# Patient Record
Sex: FEMALE | Race: WHITE | URBAN - METROPOLITAN AREA
[De-identification: names, ages, dates, MRNs, and addresses within clinical notes are randomized per-mention and may not be internally consistent; named-entity substitution may affect disease eponyms.]

---

## 2017-01-30 ENCOUNTER — APPOINTMENT (OUTPATIENT)
Dept: CT IMAGING | Facility: CLINIC | Age: 82
End: 2017-01-30
Attending: EMERGENCY MEDICINE
Payer: MEDICARE

## 2017-01-30 ENCOUNTER — APPOINTMENT (OUTPATIENT)
Dept: GENERAL RADIOLOGY | Facility: CLINIC | Age: 82
End: 2017-01-30
Attending: EMERGENCY MEDICINE
Payer: MEDICARE

## 2017-01-30 ENCOUNTER — HOSPITAL ENCOUNTER (EMERGENCY)
Facility: CLINIC | Age: 82
Discharge: HOME | End: 2017-01-30
Attending: EMERGENCY MEDICINE | Admitting: EMERGENCY MEDICINE
Payer: MEDICARE

## 2017-01-30 VITALS
SYSTOLIC BLOOD PRESSURE: 129 MMHG | BODY MASS INDEX: 23.3 KG/M2 | WEIGHT: 140 LBS | RESPIRATION RATE: 16 BRPM | OXYGEN SATURATION: 99 % | HEART RATE: 103 BPM | DIASTOLIC BLOOD PRESSURE: 69 MMHG | TEMPERATURE: 97.6 F
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
## 2017-01-30 DIAGNOSIS — S32.010A COMPRESSION FRACTURE OF L1 LUMBAR VERTEBRA, CLOSED, INITIAL ENCOUNTER (H): ICD-10-CM

## 2017-01-30 DIAGNOSIS — S22.080A T12 COMPRESSION FRACTURE, INITIAL ENCOUNTER (H): ICD-10-CM

## 2017-01-30 DIAGNOSIS — M54.50 ACUTE BILATERAL LOW BACK PAIN WITHOUT SCIATICA: ICD-10-CM

## 2017-01-30 LAB
ABO + RH BLD: NORMAL
ABO + RH BLD: NORMAL
ANION GAP SERPL CALCULATED.3IONS-SCNC: 10 MMOL/L (ref 3–14)
BASOPHILS # BLD AUTO: 0 10E9/L (ref 0–0.2)
BASOPHILS NFR BLD AUTO: 0.2 %
BLD GP AB SCN SERPL QL: NORMAL
BLOOD BANK CMNT PATIENT-IMP: NORMAL
BUN SERPL-MCNC: 34 MG/DL (ref 7–30)
CALCIUM SERPL-MCNC: 8.7 MG/DL (ref 8.5–10.1)
CHLORIDE SERPL-SCNC: 101 MMOL/L (ref 94–109)
CO2 SERPL-SCNC: 25 MMOL/L (ref 20–32)
CREAT SERPL-MCNC: 0.93 MG/DL (ref 0.52–1.04)
DIFFERENTIAL METHOD BLD: ABNORMAL
EOSINOPHIL # BLD AUTO: 0 10E9/L (ref 0–0.7)
EOSINOPHIL NFR BLD AUTO: 0.4 %
ERYTHROCYTE [DISTWIDTH] IN BLOOD BY AUTOMATED COUNT: 17.1 % (ref 10–15)
GFR SERPL CREATININE-BSD FRML MDRD: 56 ML/MIN/1.7M2
GLUCOSE SERPL-MCNC: 102 MG/DL (ref 70–99)
HCT VFR BLD AUTO: 30.4 % (ref 35–47)
HGB BLD-MCNC: 9.4 G/DL (ref 11.7–15.7)
IMM GRANULOCYTES # BLD: 0 10E9/L (ref 0–0.4)
IMM GRANULOCYTES NFR BLD: 0.4 %
INR PPP: 2.87 (ref 0.86–1.14)
INTERPRETATION ECG - MUSE: NORMAL
LYMPHOCYTES # BLD AUTO: 0.5 10E9/L (ref 0.8–5.3)
LYMPHOCYTES NFR BLD AUTO: 6.3 %
MCH RBC QN AUTO: 25.4 PG (ref 26.5–33)
MCHC RBC AUTO-ENTMCNC: 30.9 G/DL (ref 31.5–36.5)
MCV RBC AUTO: 82 FL (ref 78–100)
MONOCYTES # BLD AUTO: 0.8 10E9/L (ref 0–1.3)
MONOCYTES NFR BLD AUTO: 10.1 %
NEUTROPHILS # BLD AUTO: 6.9 10E9/L (ref 1.6–8.3)
NEUTROPHILS NFR BLD AUTO: 82.6 %
NRBC # BLD AUTO: 0 10*3/UL
NRBC BLD AUTO-RTO: 0 /100
PLATELET # BLD AUTO: 319 10E9/L (ref 150–450)
POTASSIUM SERPL-SCNC: 3.7 MMOL/L (ref 3.4–5.3)
RBC # BLD AUTO: 3.7 10E12/L (ref 3.8–5.2)
SODIUM SERPL-SCNC: 136 MMOL/L (ref 133–144)
SPECIMEN EXP DATE BLD: NORMAL
WBC # BLD AUTO: 8.3 10E9/L (ref 4–11)

## 2017-01-30 PROCEDURE — 99285 EMERGENCY DEPT VISIT HI MDM: CPT | Mod: 25

## 2017-01-30 PROCEDURE — 70450 CT HEAD/BRAIN W/O DYE: CPT

## 2017-01-30 PROCEDURE — 86850 RBC ANTIBODY SCREEN: CPT | Performed by: EMERGENCY MEDICINE

## 2017-01-30 PROCEDURE — 80048 BASIC METABOLIC PNL TOTAL CA: CPT | Performed by: EMERGENCY MEDICINE

## 2017-01-30 PROCEDURE — 72100 X-RAY EXAM L-S SPINE 2/3 VWS: CPT

## 2017-01-30 PROCEDURE — 85610 PROTHROMBIN TIME: CPT | Performed by: EMERGENCY MEDICINE

## 2017-01-30 PROCEDURE — 93005 ELECTROCARDIOGRAM TRACING: CPT

## 2017-01-30 PROCEDURE — 73523 X-RAY EXAM HIPS BI 5/> VIEWS: CPT

## 2017-01-30 PROCEDURE — 25000132 ZZH RX MED GY IP 250 OP 250 PS 637: Mod: GY | Performed by: EMERGENCY MEDICINE

## 2017-01-30 PROCEDURE — 86900 BLOOD TYPING SEROLOGIC ABO: CPT | Performed by: EMERGENCY MEDICINE

## 2017-01-30 PROCEDURE — 86901 BLOOD TYPING SEROLOGIC RH(D): CPT | Performed by: EMERGENCY MEDICINE

## 2017-01-30 PROCEDURE — A9270 NON-COVERED ITEM OR SERVICE: HCPCS | Mod: GY | Performed by: EMERGENCY MEDICINE

## 2017-01-30 PROCEDURE — 85025 COMPLETE CBC W/AUTO DIFF WBC: CPT | Performed by: EMERGENCY MEDICINE

## 2017-01-30 PROCEDURE — 73110 X-RAY EXAM OF WRIST: CPT | Mod: LT

## 2017-01-30 RX ORDER — ONDANSETRON 2 MG/ML
4 INJECTION INTRAMUSCULAR; INTRAVENOUS EVERY 30 MIN PRN
Status: DISCONTINUED | OUTPATIENT
Start: 2017-01-30 | End: 2017-01-30 | Stop reason: HOSPADM

## 2017-01-30 RX ORDER — ACETAMINOPHEN 325 MG/1
325 TABLET ORAL ONCE
Status: DISCONTINUED | OUTPATIENT
Start: 2017-01-30 | End: 2017-01-30 | Stop reason: HOSPADM

## 2017-01-30 RX ORDER — ACETAMINOPHEN 325 MG/1
650 TABLET ORAL ONCE
Status: COMPLETED | OUTPATIENT
Start: 2017-01-30 | End: 2017-01-30

## 2017-01-30 RX ORDER — MORPHINE SULFATE 2 MG/ML
2 INJECTION, SOLUTION INTRAMUSCULAR; INTRAVENOUS
Status: DISCONTINUED | OUTPATIENT
Start: 2017-01-30 | End: 2017-01-30 | Stop reason: HOSPADM

## 2017-01-30 RX ADMIN — ACETAMINOPHEN 650 MG: 325 TABLET, FILM COATED ORAL at 07:17

## 2017-01-30 NOTE — DISCHARGE INSTRUCTIONS
*You may resume diet and activities.  *Take medications as prescribed.  Tylenol for pain.  Continue your current medications.  *Follow-up with your doctor in the next 2-3 days for reevaluation and ongoing medication prescriptions and referral to physical therapy or the spine specialist.  *Return if you develop numbness, weakness, bowel or bladder incontinence, faint or feel like you will faint or become worse in any way.    Discharge Instructions  Back Pain  You were seen today for back pain. Back pain can have many causes, but most will get better without surgery or other specific treatment. Sometimes there is a herniated ( slipped ) disc. We don t usually do MRI scans to look for these right away, since most herniated discs will get better on their own with time.  Today, we did not find any evidence that your back pain was caused by a serious condition, such as an infection, fracture, or tumor. However, sometimes symptoms develop over time and cannot be found during an emergency visit, so it is very important that you follow up with your primary doctor.  Return to the Emergency Department if:    You develop a fever with your back pain.     You have weakness or change in sensation in one or both legs.    You lose control of your bowels or bladder, or can t empty your bladder.    Your pain gets much worse.     Follow-up with your doctor:    Unless your pain has completely gone away, please make an appointment with your doctor within one week.  You may need further management of your back pain, such as more pain medication, imaging such as an X-ray or MRI, or physical therapy.    What can I do to help myself?    Remain active -- People are often afraid that they will hurt their back further or delay recovery by remaining active, but this is one of the best things you can do for your back. In fact, prolonged bed rest is not recommended. Studies have shown that people with low back pain recover faster when they remain  active. Movement helps to bring blood flow to the muscles and relieve muscle spasms as well as preventing loss of muscle strength.    Heat -- Using a heating pad can help with low back pain during the first few weeks. Do not sleep with a heating pad, as you can be burned.     Pain medications -- Take a pain medication such as, acetaminophen (Tylenol ), ibuprofen (Advil , Nuprin  ) or naproxen (Aleve ).  If you have been given a narcotic (such as codeine, hydrocodone, or oxycodone) or a muscle relaxant (such as Flexeril   or Soma  ), do not drive for four hours after you have taken it. If the narcotic contains acetaminophen (Tylenol), do not take Tylenol with it. All narcotics will cause constipation, so eat a high fiber diet.          Remember that you can always come back to the Emergency Department if you are not able to see your regular doctor in the amount of time listed above, if you get any new symptoms, or if there is anything that worries you.    Opioid Medication Information    You have been given a prescription for an opioid (narcotic) pain medicine and/or have received a pain medicine while here in the Emergency Department. These medicines can make you drowsy or impaired. You must not drive, operate dangerous equipment, or engage in any other dangerous activities while taking these medications. If you drive while taking these medications, you could be arrested for DUI, or driving under the influence. Do not drink any alcohol while you are taking these medications.   Opioid pain medications can cause addiction. If you have a history of chemical dependency of any type, you are at a higher risk of becoming addicted to pain medications.  Only take these prescribed medications to treat your pain when all other options have been tried. Take it for as short a time and as few doses as possible. Store your pain pills in a secure place, as they are frequently stolen and provide a dangerous opportunity for children  or visitors in your house to start abusing these powerful medications. We will not replace any lost or stolen medicine.  As soon as your pain is better, you should flush all your remaining medication.   Many prescription pain medications contain Tylenol  (acetaminophen), including Vicodin , Tylenol #3 , Norco , Lortab , and Percocet .  You should not take any extra pills of Tylenol  if you are using these prescription medications or you can get very sick.  Do not ever take more than 4000 mg of acetaminophen in any 24 hour period.  All opioids tend to cause constipation. Drink plenty of water and eat foods that have a lot of fiber, such as fruits, vegetables, prune juice, apple juice and high fiber cereal.  Take a laxative if you don t move your bowels at least every other day. Miralax , Milk of Magnesia, Colace , or Senna  can be used to keep you regular.          Back Fracture (Compression Fracture)  Your spine stretches from the base of your skull to your tailbone. It's composed of 33 bones (vertebrae) stacked on top of one another. These bones are strong enough to support the weight of your upper body. Certain injuries, however, can damage one or more of the vertebrae and cause them to collapse. A collapsed bone in your spine is known as a compression fracture.    Causes of compression fracture  Many compression fractures result from osteoporosis. This disease thins your bones so they can't withstand normal pressure. Trauma from a car accident or hard fall can fracture even healthy vertebrae. In rare cases, vertebrae may fracture for unknown reasons.  When to go to the Emergency Room (ER)  Call 911 if you've been in an accident or had a fall and have neck or back pain, especially when pain occurs with any of these symptoms:    Loss of control over your bowels or bladder    Numbness or weakness    High fever    Unexplained back pain in a person with cancer  What to expect in the ER  A healthcare provider will ask  about your medical history and examine you. In some cases, you may have X-rays. You also may have other tests, such as computed tomography (CT) or magnetic resonance imaging (MRI). These tests can provide detailed images of your bones and spinal cord.  Treatment  Treatment will depend on the type and cause of the fracture. You will be given medicine for pain. Severe fractures or those that cause nerve problems may need surgery. Many compression fractures mend on their own.  Follow-up  As you improve, you may be given exercises to strengthen your bones. If you have osteoporosis, your healthcare provider may prescribe a medicine to treat it. Sometimes you may have pain even after the bone has healed. In that case, your healthcare provider will discuss your choices with you.    5698-4826 [employer]. [address] [place] [address], [place] [address]. All rights reserved. This information is not intended as a substitute for professional medical care. Always follow your healthcare professional's instructions.

## 2017-01-30 NOTE — ED AVS SNAPSHOT
Emergency Department    [address] [place] [id number]    Phone:  [phone]    Fax:  [phone]                                       Alida Jefferson   MRN: [id number]    Department:   Emergency Department   Date of Visit:  1/30/2017           After Visit Summary Signature Page     I have received my discharge instructions, and my questions have been answered. I have discussed any challenges I see with this plan with the nurse or doctor.    ..........................................................................................................................................  Patient/Patient Representative Signature      ..........................................................................................................................................  Patient Representative Print Name and Relationship to Patient    ..................................................               ................................................  Date                                            Time    ..........................................................................................................................................  Reviewed by Signature/Title    ...................................................              ..............................................  Date                                                            Time

## 2017-01-30 NOTE — ED AVS SNAPSHOT
Emergency Department    [address] [facility] [address]    Phone:  [phone]    Fax:  [phone]                                       Alida Jefferson   MRN: [id number]    Department:   Emergency Department   Date of Visit:  1/30/2017           Patient Information     Date Of Birth          5/21/1923        Your diagnoses for this visit were:     T12 compression fracture, initial encounter (H)     Compression fracture of L1 lumbar vertebra, closed, initial encounter (H)     Acute bilateral low back pain without sciatica     Accidental fall, initial encounter        You were seen by Luiza Mae MD.      Follow-up Information     Follow up with Steffen Saunders MD. Schedule an appointment as soon as possible for a visit in 2 days.    Specialty:  Family Practice    Contact information:    [facility]  [address]  [facility] [address] [phone]          Discharge Instructions       *You may resume diet and activities.  *Take medications as prescribed.  Tylenol for pain.  Continue your current medications.  *Follow-up with your doctor in the next 2-3 days for reevaluation and ongoing medication prescriptions and referral to physical therapy or the spine specialist.  *Return if you develop numbness, weakness, bowel or bladder incontinence, faint or feel like you will faint or become worse in any way.    Discharge Instructions  Back Pain  You were seen today for back pain. Back pain can have many causes, but most will get better without surgery or other specific treatment. Sometimes there is a herniated ( slipped ) disc. We don t usually do MRI scans to look for these right away, since most herniated discs will get better on their own with time.  Today, we did not find any evidence that your back pain was caused by a serious condition, such as an infection, fracture, or tumor. However, sometimes symptoms develop over time and cannot be found during an emergency visit, so it is very  important that you follow up with your primary doctor.  Return to the Emergency Department if:    You develop a fever with your back pain.     You have weakness or change in sensation in one or both legs.    You lose control of your bowels or bladder, or can t empty your bladder.    Your pain gets much worse.     Follow-up with your doctor:    Unless your pain has completely gone away, please make an appointment with your doctor within one week.  You may need further management of your back pain, such as more pain medication, imaging such as an X-ray or MRI, or physical therapy.    What can I do to help myself?    Remain active -- People are often afraid that they will hurt their back further or delay recovery by remaining active, but this is one of the best things you can do for your back. In fact, prolonged bed rest is not recommended. Studies have shown that people with low back pain recover faster when they remain active. Movement helps to bring blood flow to the muscles and relieve muscle spasms as well as preventing loss of muscle strength.    Heat -- Using a heating pad can help with low back pain during the first few weeks. Do not sleep with a heating pad, as you can be burned.     Pain medications -- Take a pain medication such as, acetaminophen (Tylenol ), ibuprofen (Advil , Nuprin  ) or naproxen (Aleve ).  If you have been given a narcotic (such as codeine, hydrocodone, or oxycodone) or a muscle relaxant (such as Flexeril   or Soma  ), do not drive for four hours after you have taken it. If the narcotic contains acetaminophen (Tylenol), do not take Tylenol with it. All narcotics will cause constipation, so eat a high fiber diet.          Remember that you can always come back to the Emergency Department if you are not able to see your regular doctor in the amount of time listed above, if you get any new symptoms, or if there is anything that worries you.    Opioid Medication Information    You have been  given a prescription for an opioid (narcotic) pain medicine and/or have received a pain medicine while here in the Emergency Department. These medicines can make you drowsy or impaired. You must not drive, operate dangerous equipment, or engage in any other dangerous activities while taking these medications. If you drive while taking these medications, you could be arrested for DUI, or driving under the influence. Do not drink any alcohol while you are taking these medications.   Opioid pain medications can cause addiction. If you have a history of chemical dependency of any type, you are at a higher risk of becoming addicted to pain medications.  Only take these prescribed medications to treat your pain when all other options have been tried. Take it for as short a time and as few doses as possible. Store your pain pills in a secure place, as they are frequently stolen and provide a dangerous opportunity for children or visitors in your house to start abusing these powerful medications. We will not replace any lost or stolen medicine.  As soon as your pain is better, you should flush all your remaining medication.   Many prescription pain medications contain Tylenol  (acetaminophen), including Vicodin , Tylenol #3 , Norco , Lortab , and Percocet .  You should not take any extra pills of Tylenol  if you are using these prescription medications or you can get very sick.  Do not ever take more than 4000 mg of acetaminophen in any 24 hour period.  All opioids tend to cause constipation. Drink plenty of water and eat foods that have a lot of fiber, such as fruits, vegetables, prune juice, apple juice and high fiber cereal.  Take a laxative if you don t move your bowels at least every other day. Miralax , Milk of Magnesia, Colace , or Senna  can be used to keep you regular.          Back Fracture (Compression Fracture)  Your spine stretches from the base of your skull to your tailbone. It's composed of 33 bones  (vertebrae) stacked on top of one another. These bones are strong enough to support the weight of your upper body. Certain injuries, however, can damage one or more of the vertebrae and cause them to collapse. A collapsed bone in your spine is known as a compression fracture.    Causes of compression fracture  Many compression fractures result from osteoporosis. This disease thins your bones so they can't withstand normal pressure. Trauma from a car accident or hard fall can fracture even healthy vertebrae. In rare cases, vertebrae may fracture for unknown reasons.  When to go to the Emergency Room (ER)  Call 911 if you've been in an accident or had a fall and have neck or back pain, especially when pain occurs with any of these symptoms:    Loss of control over your bowels or bladder    Numbness or weakness    High fever    Unexplained back pain in a person with cancer  What to expect in the ER  A healthcare provider will ask about your medical history and examine you. In some cases, you may have X-rays. You also may have other tests, such as computed tomography (CT) or magnetic resonance imaging (MRI). These tests can provide detailed images of your bones and spinal cord.  Treatment  Treatment will depend on the type and cause of the fracture. You will be given medicine for pain. Severe fractures or those that cause nerve problems may need surgery. Many compression fractures mend on their own.  Follow-up  As you improve, you may be given exercises to strengthen your bones. If you have osteoporosis, your healthcare provider may prescribe a medicine to treat it. Sometimes you may have pain even after the bone has healed. In that case, your healthcare provider will discuss your choices with you.    3061-5447 [employer]. [address] [place] [address], [place] [address]. All rights reserved. This information is not intended as a substitute for professional medical care. Always follow your healthcare  professional's instructions.                24 Hour Appointment Hotline       To make an appointment at any [facility], call [phone] ([phone]). If you don't have a family doctor or clinic, we will help you find one. [facility] clinics are conveniently located to serve the needs of you and your family.          ED Discharge Orders     Home Care OT Referral for Hospital Discharge       OT to eval and treat    Your provider has ordered home care - occupational therapy. If you have not been contacted within 2 days of your discharge please call the department phone number listed on the top of this document.            Home Care PT Referral for Hospital Discharge       PT to eval and treat SOC 1/31/2017  HHA for assistance with ADL    Your provider has ordered home care - physical therapy. If you have not been contacted within 2 days of your discharge please call the department phone number listed on the top of this document.            MD face to face encounter       Documentation of Face to Face and Certification for Home Health Services    I certify that patient: Alida Jefferson is under my care and that I, or a nurse practitioner or physician's assistant working with me, had a face-to-face encounter that meets the physician face-to-face encounter requirements with this patient on: 1/30/2017.    This encounter with the patient was in whole, or in part, for the following medical condition, which is the primary reason for home health care: recent fall needs assistance with pain control and ADL's.    I certify that, based on my findings, the following services are medically necessary home health services: Physical Therapy.    My clinical findings support the need for the above services because: Recent fall and back pain/compression fracture    Further, I certify that my clinical findings support that this patient is homebound (i.e. absences from home require considerable and taxing effort and are for  medical reasons or [facility] services or infrequently or of short duration when for other reasons) because: Requires assistance of another person or specialized equipment to access medical services because patient: Has prohibitive pain during ambulation...    Based on the above findings. I certify that this patient is confined to the home and needs intermittent skilled nursing care, physical therapy and/or speech therapy.  The patient is under my care, and I have initiated the establishment of the plan of care.  This patient will be followed by a physician who will periodically review the plan of care.  Physician/Provider to provide follow up care: Steffen Saunders    Attending hospital physician (the Medicare certified [place] provider): Claudy Bateman  Physician Signature: See electronic signature associated with these discharge orders.  Date: 1/30/2017                     Review of your medicines      Our records show that you are taking the medicines listed below. If these are incorrect, please call your family doctor or clinic.        Dose / Directions Last dose taken    CARVEDILOL PO   Dose:  6.25 mg        Take 6.25 mg by mouth 2 times daily   Refills:  0        ferrous gluconate 324 (38 FE) MG tablet   Commonly known as:  FERGON   Dose:  324 mg   Quantity:  30 tablet        Take 1 tablet (324 mg) by mouth daily   Refills:  0        lisinopril-hydrochlorothiazide 10-12.5 MG per tablet   Commonly known as:  PRINZIDE/ZESTORETIC   Dose:  1 tablet        Take 1 tablet by mouth daily   Refills:  0        oxyCODONE 5 MG IR tablet   Commonly known as:  ROXICODONE   Dose:  5 mg   Quantity:  40 tablet        Take 1 tablet (5 mg) by mouth every 3 hours as needed for moderate to severe pain   Refills:  0        senna-docusate 8.6-50 MG per tablet   Commonly known as:  SENOKOT-S;PERICOLACE   Dose:  1-2 tablet   Quantity:  60 tablet        Take 1-2 tablets by mouth 2 times daily as needed (constipation )   Refills:  0         Warfarin Therapy Reminder   Dose:  1 each        1 each continuous prn   Refills:  0        zolpidem 5 MG tablet   Commonly known as:  AMBIEN   Dose:  5 mg   Quantity:  30 tablet        Take 1 tablet (5 mg) by mouth nightly as needed for sleep   Refills:  0                Procedures and tests performed during your visit     ABO/Rh type and screen    Basic metabolic panel    CBC with platelets differential    CT Head w/o Contrast    EKG 12-lead, tracing only    I have reviewed and agree with all the recommendations and orders detailed in this document.    INR    Peripheral IV: Standard    Pulse oximetry nursing    Vital signs    Wrist XR, G/E 3 views, left    XR Lumbar Spine 2/3 Views    XR Pelvis and Hip Bilateral 2 Views      Orders Needing Specimen Collection     None      Pending Results     No orders found from 1/29/2017 to 1/31/2017.            Pending Culture Results     No orders found from 1/29/2017 to 1/31/2017.       Test Results from your hospital stay           1/30/2017  6:21 AM - Interface, [employer] Results      Component Results     Component Value Ref Range & Units Status    WBC 8.3 4.0 - 11.0 10e9/L Final    RBC Count 3.70 (L) 3.8 - 5.2 10e12/L Final    Hemoglobin 9.4 (L) 11.7 - 15.7 g/dL Final    Hematocrit 30.4 (L) 35.0 - 47.0 % Final    MCV 82 78 - 100 fl Final    MCH 25.4 (L) 26.5 - 33.0 pg Final    MCHC 30.9 (L) 31.5 - 36.5 g/dL Final    RDW 17.1 (H) 10.0 - 15.0 % Final    Platelet Count 319 150 - 450 10e9/L Final    Diff Method Automated Method  Final    % Neutrophils 82.6 % Final    % Lymphocytes 6.3 % Final    % Monocytes 10.1 % Final    % Eosinophils 0.4 % Final    % Basophils 0.2 % Final    % Immature Granulocytes 0.4 % Final    Nucleated RBCs 0 0 /100 Final    Absolute Neutrophil 6.9 1.6 - 8.3 10e9/L Final    Absolute Lymphocytes 0.5 (L) 0.8 - 5.3 10e9/L Final    Absolute Monocytes 0.8 0.0 - 1.3 10e9/L Final    Absolute Eosinophils 0.0 0.0 - 0.7 10e9/L Final    Absolute Basophils 0.0  0.0 - 0.2 10e9/L Final    Abs Immature Granulocytes 0.0 0 - 0.4 10e9/L Final    Absolute Nucleated RBC 0.0  Final         1/30/2017  6:33 AM - Interface, [employer] Results      Component Results     Component Value Ref Range & Units Status    INR 2.87 (H) 0.86 - 1.14 Final         1/30/2017  6:39 AM - Interface, [employer] Results      Component Results     Component Value Ref Range & Units Status    Sodium 136 133 - 144 mmol/L Final    Potassium 3.7 3.4 - 5.3 mmol/L Final    Chloride 101 94 - 109 mmol/L Final    Carbon Dioxide 25 20 - 32 mmol/L Final    Anion Gap 10 3 - 14 mmol/L Final    Glucose 102 (H) 70 - 99 mg/dL Final    Urea Nitrogen 34 (H) 7 - 30 mg/dL Final    Creatinine 0.93 0.52 - 1.04 mg/dL Final    GFR Estimate 56 (L) >60 mL/min/1.7m2 Final    Non  GFR Calc    GFR Estimate If Black 68 >60 mL/min/1.7m2 Final    African American GFR Calc    Calcium 8.7 8.5 - 10.1 mg/dL Final         1/30/2017  7:05 AM - Interface, [employer] Results      Component Results     Component    ABO    A    RH(D)     Pos    Antibody Screen    Neg    Test Valid Only At    [facility]    Specimen Expires    02/02/2017 1/30/2017  7:03 AM - Interface, Radiant Ib      Narrative     LUMBAR SPINE THREE VIEWS January 30, 2017 6:54 AM     HISTORY: Fall. Lumbar pain.    COMPARISON: None.        Impression     IMPRESSION:   1. Mild age-indeterminate compression deformities of the superior and  inferior endplates of the L1 vertebral body and superior endplate of  the T12 vertebral body.  2. Normal alignment of the lumbar spine.  3. Diffuse osteopenia.  4. Atherosclerotic calcification in the abdominal aorta.    CATA HOLLOWAY MD         1/30/2017  8:22 AM - Interface, Radiant Ib      Narrative     CT SCAN OF THE HEAD WITHOUT CONTRAST January 30, 2017 7:05 AM     HISTORY: Head injury, on coumadin    TECHNIQUE: Axial images of the head and coronal reformations without  IV contrast material.  Radiation dose  for this scan was reduced using  automated exposure control, adjustment of the mA and/or kV according  to patient size, or iterative reconstruction technique.    COMPARISON: None.    FINDINGS: Mild cerebral atrophy is present. There are patchy white  matter changes in both hemispheres without mass effect. There is no  evidence for intracranial hemorrhage, mass effect, acute infarct, or  skull fracture. Some vascular calcifications are noted at the skull  base.        Impression     IMPRESSION: Chronic changes. No evidence for intracranial hemorrhage  or any acute process.    TRINY KATE MD         1/30/2017  7:04 AM - Interface, Radiant Ib      Narrative     PELVIS AND BILATERAL HIPS January 30, 2017 6:53 AM     HISTORY: Fall. Low back and bilateral hip pain.    COMPARISON: 8/20/2016.        Impression     IMPRESSION:   1. No convincing acute fracture or malalignment of the pelvis or  bilateral hips.  2. Diffuse osteopenia.  3. Surgical hardware is present in the right hip, bridging a healed  intertrochanteric fracture.    CATA HOLLOWAY MD         1/30/2017  7:03 AM - Interface, Radiant Ib      Narrative     LEFT WRIST 3 VIEWS   1/30/2017 6:53 AM     HISTORY: Fall. Swelling.    COMPARISON: None.        Impression     IMPRESSION:   1. No convincing acute fracture or malalignment of the left wrist.  2. Mild chronic-appearing irregularity of the distal left radius,  likely related to an old fracture.  3. Diffuse osteopenia.  4. Moderate degenerative changes in the left first carpometacarpal  joint.  5. Chondrocalcinosis in the triangular fibrocartilage complex.    CATA HOLLOWAY MD                Clinical Quality Measure: Blood Pressure Screening     Your blood pressure was checked while you were in the emergency department today. The last reading we obtained was  BP: 129/69 mmHg . Please read the guidelines below about what these numbers mean and what you should do about them.  If your systolic blood pressure (the  "top number) is less than 120 and your diastolic blood pressure (the bottom number) is less than 80, then your blood pressure is normal. There is nothing more that you need to do about it.  If your systolic blood pressure (the top number) is 120-139 or your diastolic blood pressure (the bottom number) is 80-89, your blood pressure may be higher than it should be. You should have your blood pressure rechecked within a year by a primary care provider.  If your systolic blood pressure (the top number) is 140 or greater or your diastolic blood pressure (the bottom number) is 90 or greater, you may have high blood pressure. High blood pressure is treatable, but if left untreated over time it can put you at risk for heart attack, stroke, or kidney failure. You should have your blood pressure rechecked by a primary care provider within the next 4 weeks.  If your provider in the emergency department today gave you specific instructions to follow-up with your doctor or provider even sooner than that, you should follow that instruction and not wait for up to 4 weeks for your follow-up visit.        Thank you for choosing [facility]       Thank you for choosing [facility] for your care. Our goal is always to provide you with excellent care. Hearing back from our patients is one way we can continue to improve our services. Please take a few minutes to complete the written survey that you may receive in the mail after you visit with us. Thank you!        Statement of Approval     Ordered          01/30/17 1026  I have reviewed and agree with all the recommendations and orders detailed in this document.   EFFECTIVE NOW     Approved and electronically signed by:  Claudy Bateman MD             [employer] Information     [employer] lets you send messages to your doctor, view your test results, renew your prescriptions, schedule appointments and more. To sign up, go to [email or web address] . Click on \"Log in\" on the left side of the " "screen, which will take you to the Welcome page. Then click on \"Sign up Now\" on the right side of the page.     You will be asked to enter the access code listed below, as well as some personal information. Please follow the directions to create your username and password.     Your access code is: [email or web address]  Expires: 2017  9:03 AM     Your access code will  in 90 days. If you need help or a new code, please call your [facility] or [phone].        Care EveryWhere ID     This is your Care EveryWhere ID. This could be used by other organizations to access your [facility] medical records  [id number]        After Visit Summary       This is your record. Keep this with you and show to your community pharmacist(s) and doctor(s) at your next visit.                  "

## 2017-01-30 NOTE — ED NOTES
Bed: ED20  Expected date: 1/30/17  Expected time: 5:45 AM  Means of arrival: Ambulance  Comments:  [facility] 29F fall; back pain

## 2017-01-30 NOTE — PROGRESS NOTES
I was asked to see this pt for discharge planning.  I spoke with this pt and a close friend who is at bedside and this pts daughter Eden who was on speaker phone.  This pt really wants to go home.  Eden, daughter, has already contacted [facility] for 24/7 in home PCA help. I did mention TCU as an option and the $5,000 up front.  This pt declined. This pt stated that Kirt from [facility] comes to see her and is to come on Wednesday.  I offered to call [facility] and increase services.  This pt, Eden and the family friend all agreed to this plan.  I contacted [facility] and Kirt RN is scheduled to come out tomorrow--instead of Wednesday. I increased services adding PT/OT/HHA. The orders for PT/OT/HHA were faxed to [facility] [phone].  Staff updated.

## 2017-01-30 NOTE — ED PROVIDER NOTES
History     Chief Complaint:  Fall    HPI:     Alida Jefferson is a 89 year old female with a history of hypertension, atrial fibrillation, mitral valve replacement, and is on Coumadin who presents for evaluation after a fall. The patient reports that she fell earlier tonight onto her bottom and hit her head on a tea cart in her home; she believes she lost consciousness momentarily. Luckily, her neighbor downstairs heard her fall and came upstairs and helped her stand up, but she was able to ambulate back into bed. Since the incident, she has not been able to sleep due to worsening pain, prompting the EMS call. She denies any urinary symptoms, neck pain, or neck stiffness.   She reports the pain is bilateral in her mid lower back and is fine unless she is moving or turning.  No numbness, weakness, bowel or bladder incontinence.     Allergies:  Keflex  Ciprofloaxcin      Medications:    Roxicodone  Warfarin  Fergon  Senna-docusate  Ambien  Lisinopril  Carvedilol      Past Medical History:    Hypertension  Atrial fibrillation    Past Surgical History:    Mitral valve replacement  Open reduction, hip    Family History:    History reviewed. No pertinent family history.       Social History:  Smoking status: Never Smoker  Alcohol use: Yes   Marital Status:       Review of Systems  As noted per HPI.  Remainder of a 10 point review of systems was negative.    Physical Exam     Patient Vitals for the past 24 hrs:   BP Temp Temp src Pulse Resp SpO2 Weight   01/30/17 0554 109/61 mmHg 97.6  F (36.4  C) Oral 103 12 95 % 63.504 kg (140 lb)        Physical Exam:  General: Well-nourished, appears to be in pain  Eyes: PERRL, conjunctivae pink no scleral icterus or conjunctival injection  ENT:  Moist mucus membranes, posterior oropharynx clear without erythema or exudates  Respiratory:  Lungs clear to auscultation bilaterally, no crackles/rubs/wheezes.  Good air movement  CV: Normal rate and rhythm, no  murmurs/rubs/gallops  GI:  Abdomen soft and non-distended.  Normoactive BS.  No tenderness, guarding or rebound  Skin: Warm, dry.  No rashes or petechiae  Musculoskeletal: Diffuse tenderness upper lumbar spine and pain with movement. Ecchymosis over left wrist, minimally tender.  No peripheral edema or calf tenderness  Neuro: Alert and oriented to person/place/time  Psychiatric: Normal affect      Emergency Department Course     ECG (06:02:22):  Rate 101 bpm. WY interval 168. QRS duration 82. QT/QTc 370/479. P-R-T axes 68-91-40. Sinus tachycardia with premature atrial complexes. Rightward axis. Anterior infarct, age undetermined. Abnormal ECG. Interpreted at 0603 by Luiza Mae MD.    No significant change compared to ECG dated 8/20/2016.     Imaging:  Radiographic findings were communicated with the patient who voiced understanding of the findings.    CT Head w/o Contrast:  IMPRESSION: Chronic changes. No evidence for intracranial hemorrhage  or any acute process.  As read by Radiology.     XR Pelvis and Hip Bilateral 2 views:  IMPRESSION:   1. No convincing acute fracture or malalignment of the pelvis or  bilateral hips.  2. Diffuse osteopenia.  3. Surgical hardware is present in the right hip, bridging a healed  intertrochanteric fracture.  As read by Radiology.     XR Lumbar Spine 2/3 views:  IMPRESSION:   1. Mild age-indeterminate compression deformities of the superior and  inferior endplates of the L1 vertebral body and superior endplate of  the T12 vertebral body.  2. Normal alignment of the lumbar spine.  3. Diffuse osteopenia.  4. Atherosclerotic calcification in the abdominal aorta.  As read by Radiology.     Wrist XR, G/E 3 views, left (Final result) Result time: 01/30/17 07:01:57     Final result by Hilton Hong MD (01/30/17 07:01:57)     Impression:     IMPRESSION:   1. No convincing acute fracture or malalignment of the left wrist.  2. Mild chronic-appearing irregularity of the distal left  radius,  likely related to an old fracture.  3. Diffuse osteopenia.  4. Moderate degenerative changes in the left first carpometacarpal  joint.  5. Chondrocalcinosis in the triangular fibrocartilage complex.         Laboratory:  CBC: RBC 3.70 (L), HGB 9.4 (L), HCT 30.4 (L), MCH 25.4 (L), MCHC 30.9 (L), RDW 17.1 (H), Absolute Lymphocytes 0.5 (L), o/w WNL (WBC 8.3, )    INR: 2.87 (H)  Basal Metabolic Panel: Glucose 102 (H), BUN 34 (H), GFR Estimate 56 (L), WNL (Creatinine 0.93)   ABO/Rh: Pending    Interventions:  Tylenol 1000 mg po    Emergency Department Course:  Past medical records, nursing notes, and vitals reviewed.  0545: I performed an exam of the patient and obtained history, as documented above.    IV inserted and blood drawn.     An ECG was obtained.    The patient was sent for an X-Ray of the pelvis, hip, and lumbar spine as well as a head CT while in the emergency department, findings above.      The above interventions were administered.    I updated the patient on her results.      07:10:  I spoke with Eden Srivastava, the patient's daughter who will be heading to the ED to work on home care and assist with disposition.    07:15  I signed out to my partner Dr. Bateman.    Impression & Plan      Medical Decision Making:  Ms. Jefferson comes after a mechanical fall with complaint of low back and pelvis pain.  She also hit her head and is on warfarin for atrial fibrillation.  Head CT was negative.  EKG is nonischemic.  Pelvis and hip xray are normal.  Lumbar spine films show mild compressior deformities of T12/L1 and I suspect these are new.  She had no signs or symptoms of cauda equinae.  She wishes to go home though she lives independently and alone and it is unclear if she will be able to manage on her own.  She was given tylenol for pain and we will attempt ambulation with a walker after this has had a chance to work.  Her daughter Eden is on the way to the ED to assist in determining and formulating a  plan to be sure she is able to go home and safe at home.  I signed out to my partner who graciously agreed to await the daughter's arrival and the patient's road test and assist with disposition.    Diagnosis:    ICD-10-CM    1. T12 compression fracture, initial encounter (H) M48.54XA    2. Compression fracture of L1 lumbar vertebra, closed, initial encounter (H) S32.010A    3. Acute bilateral low back pain without sciatica M54.5    4. Accidental fall, initial encounter W19.XXXA        Disposition:  Pending    Molly Caro  1/30/2017    EMERGENCY DEPARTMENT    IMolly, am serving as a scribe at 5:45 AM on 1/30/2017 to document services personally performed by Luiza Mae MD based on my observations and the provider's statements to me.      Luiza Mae MD  01/30/17 0807

## 2018-05-25 ENCOUNTER — HOSPITAL ENCOUNTER (INPATIENT)
Facility: CLINIC | Age: 83
LOS: 3 days | Discharge: HOME HEALTH | DRG: 378 | End: 2018-05-28
Attending: EMERGENCY MEDICINE | Admitting: HOSPITALIST
Payer: MEDICARE

## 2018-05-25 ENCOUNTER — SURGERY (OUTPATIENT)
Age: 83
End: 2018-05-25

## 2018-05-25 DIAGNOSIS — K92.2 UPPER GI BLEED: ICD-10-CM

## 2018-05-25 DIAGNOSIS — D64.9 ANEMIA, UNSPECIFIED TYPE: ICD-10-CM

## 2018-05-25 DIAGNOSIS — I25.10 CORONARY ARTERY DISEASE INVOLVING NATIVE CORONARY ARTERY OF NATIVE HEART, ANGINA PRESENCE UNSPECIFIED: Primary | ICD-10-CM

## 2018-05-25 PROBLEM — T39.395A GI BLEED DUE TO NSAIDS: Status: ACTIVE | Noted: 2018-05-25

## 2018-05-25 LAB
ALBUMIN SERPL-MCNC: 2.6 G/DL (ref 3.4–5)
ALP SERPL-CCNC: 125 U/L (ref 40–150)
ALT SERPL W P-5'-P-CCNC: 16 U/L (ref 0–50)
ANION GAP SERPL CALCULATED.3IONS-SCNC: 7 MMOL/L (ref 3–14)
AST SERPL W P-5'-P-CCNC: 19 U/L (ref 0–45)
BASOPHILS # BLD AUTO: 0 10E9/L (ref 0–0.2)
BASOPHILS NFR BLD AUTO: 0.2 %
BILIRUB SERPL-MCNC: 0.5 MG/DL (ref 0.2–1.3)
BLD PROD TYP BPU: NORMAL
BLD UNIT ID BPU: 0
BLOOD PRODUCT CODE: NORMAL
BPU ID: NORMAL
BUN SERPL-MCNC: 61 MG/DL (ref 7–30)
CALCIUM SERPL-MCNC: 7.8 MG/DL (ref 8.5–10.1)
CHLORIDE SERPL-SCNC: 106 MMOL/L (ref 94–109)
CO2 SERPL-SCNC: 30 MMOL/L (ref 20–32)
CREAT SERPL-MCNC: 1.64 MG/DL (ref 0.52–1.04)
DIFFERENTIAL METHOD BLD: ABNORMAL
EOSINOPHIL # BLD AUTO: 0 10E9/L (ref 0–0.7)
EOSINOPHIL NFR BLD AUTO: 0 %
ERYTHROCYTE [DISTWIDTH] IN BLOOD BY AUTOMATED COUNT: 18.3 % (ref 10–15)
GFR SERPL CREATININE-BSD FRML MDRD: 29 ML/MIN/1.7M2
GLUCOSE SERPL-MCNC: 110 MG/DL (ref 70–99)
HCT VFR BLD AUTO: 22.7 % (ref 35–47)
HGB BLD-MCNC: 7 G/DL (ref 11.7–15.7)
HGB BLD-MCNC: 8.4 G/DL (ref 11.7–15.7)
IMM GRANULOCYTES # BLD: 0 10E9/L (ref 0–0.4)
IMM GRANULOCYTES NFR BLD: 0.1 %
INR PPP: 1.31 (ref 0.86–1.14)
INTERPRETATION ECG - MUSE: NORMAL
LACTATE BLD-SCNC: 1.6 MMOL/L (ref 0.4–1.9)
LYMPHOCYTES # BLD AUTO: 0.6 10E9/L (ref 0.8–5.3)
LYMPHOCYTES NFR BLD AUTO: 6.1 %
MCH RBC QN AUTO: 27.6 PG (ref 26.5–33)
MCHC RBC AUTO-ENTMCNC: 30.8 G/DL (ref 31.5–36.5)
MCV RBC AUTO: 89 FL (ref 78–100)
MONOCYTES # BLD AUTO: 0.6 10E9/L (ref 0–1.3)
MONOCYTES NFR BLD AUTO: 5.5 %
NEUTROPHILS # BLD AUTO: 9.3 10E9/L (ref 1.6–8.3)
NEUTROPHILS NFR BLD AUTO: 88.1 %
NRBC # BLD AUTO: 0 10*3/UL
NRBC BLD AUTO-RTO: 0 /100
PLATELET # BLD AUTO: 211 10E9/L (ref 150–450)
POTASSIUM SERPL-SCNC: 4.5 MMOL/L (ref 3.4–5.3)
PROT SERPL-MCNC: 5.2 G/DL (ref 6.8–8.8)
RBC # BLD AUTO: 2.54 10E12/L (ref 3.8–5.2)
SODIUM SERPL-SCNC: 143 MMOL/L (ref 133–144)
TRANSFUSION STATUS PATIENT QL: NORMAL
TRANSFUSION STATUS PATIENT QL: NORMAL
UPPER GI ENDOSCOPY: NORMAL
WBC # BLD AUTO: 10.6 10E9/L (ref 4–11)

## 2018-05-25 PROCEDURE — 36415 COLL VENOUS BLD VENIPUNCTURE: CPT | Performed by: INTERNAL MEDICINE

## 2018-05-25 PROCEDURE — 85018 HEMOGLOBIN: CPT | Performed by: INTERNAL MEDICINE

## 2018-05-25 PROCEDURE — A9270 NON-COVERED ITEM OR SERVICE: HCPCS | Mod: GY | Performed by: HOSPITALIST

## 2018-05-25 PROCEDURE — 25000128 H RX IP 250 OP 636: Performed by: INTERNAL MEDICINE

## 2018-05-25 PROCEDURE — 86923 COMPATIBILITY TEST ELECTRIC: CPT | Performed by: EMERGENCY MEDICINE

## 2018-05-25 PROCEDURE — 0W3P8ZZ CONTROL BLEEDING IN GASTROINTESTINAL TRACT, VIA NATURAL OR ARTIFICIAL OPENING ENDOSCOPIC: ICD-10-PCS | Performed by: INTERNAL MEDICINE

## 2018-05-25 PROCEDURE — 25000125 ZZHC RX 250: Performed by: HOSPITALIST

## 2018-05-25 PROCEDURE — 85610 PROTHROMBIN TIME: CPT | Performed by: EMERGENCY MEDICINE

## 2018-05-25 PROCEDURE — 25000132 ZZH RX MED GY IP 250 OP 250 PS 637: Mod: GY | Performed by: HOSPITALIST

## 2018-05-25 PROCEDURE — 12000000 ZZH R&B MED SURG/OB

## 2018-05-25 PROCEDURE — 86850 RBC ANTIBODY SCREEN: CPT | Performed by: EMERGENCY MEDICINE

## 2018-05-25 PROCEDURE — 25000125 ZZHC RX 250: Performed by: INTERNAL MEDICINE

## 2018-05-25 PROCEDURE — 85025 COMPLETE CBC W/AUTO DIFF WBC: CPT | Performed by: EMERGENCY MEDICINE

## 2018-05-25 PROCEDURE — 36415 COLL VENOUS BLD VENIPUNCTURE: CPT | Performed by: HOSPITALIST

## 2018-05-25 PROCEDURE — 93005 ELECTROCARDIOGRAM TRACING: CPT

## 2018-05-25 PROCEDURE — 99153 MOD SED SAME PHYS/QHP EA: CPT | Performed by: INTERNAL MEDICINE

## 2018-05-25 PROCEDURE — P9016 RBC LEUKOCYTES REDUCED: HCPCS | Performed by: EMERGENCY MEDICINE

## 2018-05-25 PROCEDURE — 96374 THER/PROPH/DIAG INJ IV PUSH: CPT

## 2018-05-25 PROCEDURE — 25000128 H RX IP 250 OP 636: Performed by: HOSPITALIST

## 2018-05-25 PROCEDURE — 83605 ASSAY OF LACTIC ACID: CPT | Performed by: HOSPITALIST

## 2018-05-25 PROCEDURE — 86901 BLOOD TYPING SEROLOGIC RH(D): CPT | Performed by: EMERGENCY MEDICINE

## 2018-05-25 PROCEDURE — 80053 COMPREHEN METABOLIC PANEL: CPT | Performed by: EMERGENCY MEDICINE

## 2018-05-25 PROCEDURE — 99285 EMERGENCY DEPT VISIT HI MDM: CPT | Mod: 25

## 2018-05-25 PROCEDURE — 43255 EGD CONTROL BLEEDING ANY: CPT | Performed by: INTERNAL MEDICINE

## 2018-05-25 PROCEDURE — 99223 1ST HOSP IP/OBS HIGH 75: CPT | Mod: AI | Performed by: HOSPITALIST

## 2018-05-25 PROCEDURE — 86900 BLOOD TYPING SEROLOGIC ABO: CPT | Performed by: EMERGENCY MEDICINE

## 2018-05-25 RX ORDER — PROCHLORPERAZINE 25 MG
12.5 SUPPOSITORY, RECTAL RECTAL EVERY 12 HOURS PRN
Status: DISCONTINUED | OUTPATIENT
Start: 2018-05-25 | End: 2018-05-28 | Stop reason: HOSPADM

## 2018-05-25 RX ORDER — AMOXICILLIN 250 MG
2 CAPSULE ORAL 2 TIMES DAILY PRN
Status: DISCONTINUED | OUTPATIENT
Start: 2018-05-25 | End: 2018-05-28 | Stop reason: HOSPADM

## 2018-05-25 RX ORDER — AMOXICILLIN 250 MG
1 CAPSULE ORAL 2 TIMES DAILY PRN
Status: DISCONTINUED | OUTPATIENT
Start: 2018-05-25 | End: 2018-05-28 | Stop reason: HOSPADM

## 2018-05-25 RX ORDER — HYDRALAZINE HYDROCHLORIDE 20 MG/ML
10 INJECTION INTRAMUSCULAR; INTRAVENOUS EVERY 4 HOURS PRN
Status: DISCONTINUED | OUTPATIENT
Start: 2018-05-25 | End: 2018-05-28 | Stop reason: HOSPADM

## 2018-05-25 RX ORDER — FENTANYL CITRATE 50 UG/ML
INJECTION, SOLUTION INTRAMUSCULAR; INTRAVENOUS PRN
Status: DISCONTINUED | OUTPATIENT
Start: 2018-05-25 | End: 2018-05-25 | Stop reason: HOSPADM

## 2018-05-25 RX ORDER — PROCHLORPERAZINE MALEATE 5 MG
5 TABLET ORAL EVERY 6 HOURS PRN
Status: DISCONTINUED | OUTPATIENT
Start: 2018-05-25 | End: 2018-05-28 | Stop reason: HOSPADM

## 2018-05-25 RX ORDER — SODIUM CHLORIDE 9 MG/ML
INJECTION, SOLUTION INTRAVENOUS CONTINUOUS
Status: DISCONTINUED | OUTPATIENT
Start: 2018-05-25 | End: 2018-05-26

## 2018-05-25 RX ORDER — NALOXONE HYDROCHLORIDE 0.4 MG/ML
.1-.4 INJECTION, SOLUTION INTRAMUSCULAR; INTRAVENOUS; SUBCUTANEOUS
Status: DISCONTINUED | OUTPATIENT
Start: 2018-05-25 | End: 2018-05-28 | Stop reason: HOSPADM

## 2018-05-25 RX ORDER — ONDANSETRON 4 MG/1
4 TABLET, ORALLY DISINTEGRATING ORAL EVERY 6 HOURS PRN
Status: DISCONTINUED | OUTPATIENT
Start: 2018-05-25 | End: 2018-05-28 | Stop reason: HOSPADM

## 2018-05-25 RX ORDER — TRAMADOL HYDROCHLORIDE 50 MG/1
50 TABLET ORAL EVERY 6 HOURS PRN
Status: DISCONTINUED | OUTPATIENT
Start: 2018-05-25 | End: 2018-05-26

## 2018-05-25 RX ORDER — ALPRAZOLAM 0.5 MG
0.5 TABLET ORAL AT BEDTIME
Status: DISCONTINUED | OUTPATIENT
Start: 2018-05-25 | End: 2018-05-28 | Stop reason: HOSPADM

## 2018-05-25 RX ORDER — BISACODYL 10 MG
10 SUPPOSITORY, RECTAL RECTAL DAILY PRN
Status: DISCONTINUED | OUTPATIENT
Start: 2018-05-25 | End: 2018-05-28 | Stop reason: HOSPADM

## 2018-05-25 RX ORDER — METOPROLOL TARTRATE 1 MG/ML
2.5 INJECTION, SOLUTION INTRAVENOUS EVERY 4 HOURS PRN
Status: DISCONTINUED | OUTPATIENT
Start: 2018-05-25 | End: 2018-05-28 | Stop reason: HOSPADM

## 2018-05-25 RX ORDER — AMIODARONE HYDROCHLORIDE 200 MG/1
200 TABLET ORAL DAILY
Status: DISCONTINUED | OUTPATIENT
Start: 2018-05-26 | End: 2018-05-28 | Stop reason: HOSPADM

## 2018-05-25 RX ORDER — ACETAMINOPHEN 325 MG/1
650 TABLET ORAL EVERY 4 HOURS PRN
Status: DISCONTINUED | OUTPATIENT
Start: 2018-05-25 | End: 2018-05-28 | Stop reason: HOSPADM

## 2018-05-25 RX ORDER — ONDANSETRON 2 MG/ML
4 INJECTION INTRAMUSCULAR; INTRAVENOUS EVERY 6 HOURS PRN
Status: DISCONTINUED | OUTPATIENT
Start: 2018-05-25 | End: 2018-05-28 | Stop reason: HOSPADM

## 2018-05-25 RX ORDER — OXYCODONE AND ACETAMINOPHEN 5; 325 MG/1; MG/1
1-2 TABLET ORAL EVERY 4 HOURS PRN
Status: DISCONTINUED | OUTPATIENT
Start: 2018-05-25 | End: 2018-05-28 | Stop reason: HOSPADM

## 2018-05-25 RX ORDER — METOPROLOL SUCCINATE 25 MG/1
25 TABLET, EXTENDED RELEASE ORAL DAILY
Status: DISCONTINUED | OUTPATIENT
Start: 2018-05-26 | End: 2018-05-28 | Stop reason: HOSPADM

## 2018-05-25 RX ORDER — LIDOCAINE 40 MG/G
CREAM TOPICAL
Status: DISCONTINUED | OUTPATIENT
Start: 2018-05-25 | End: 2018-05-25 | Stop reason: HOSPADM

## 2018-05-25 RX ORDER — POLYETHYLENE GLYCOL 3350 17 G/17G
1 POWDER, FOR SOLUTION ORAL DAILY PRN
COMMUNITY

## 2018-05-25 RX ADMIN — PANTOPRAZOLE SODIUM 40 MG: 40 INJECTION, POWDER, FOR SOLUTION INTRAVENOUS at 22:06

## 2018-05-25 RX ADMIN — MIDAZOLAM 1 MG: 1 INJECTION INTRAMUSCULAR; INTRAVENOUS at 14:42

## 2018-05-25 RX ADMIN — Medication 1 MG: at 22:04

## 2018-05-25 RX ADMIN — TOPICAL ANESTHETIC 1 SPRAY: 200 SPRAY DENTAL; PERIODONTAL at 14:38

## 2018-05-25 RX ADMIN — SODIUM CHLORIDE: 9 INJECTION, SOLUTION INTRAVENOUS at 17:03

## 2018-05-25 RX ADMIN — FENTANYL CITRATE 25 MCG: 50 INJECTION, SOLUTION INTRAMUSCULAR; INTRAVENOUS at 14:38

## 2018-05-25 RX ADMIN — MIDAZOLAM 1 MG: 1 INJECTION INTRAMUSCULAR; INTRAVENOUS at 14:38

## 2018-05-25 RX ADMIN — ALPRAZOLAM 0.5 MG: 0.5 TABLET ORAL at 22:04

## 2018-05-25 RX ADMIN — PANTOPRAZOLE SODIUM 40 MG: 40 INJECTION, POWDER, FOR SOLUTION INTRAVENOUS at 13:37

## 2018-05-25 ASSESSMENT — ENCOUNTER SYMPTOMS
VOMITING: 1
NAUSEA: 0
BLOOD IN STOOL: 0
LIGHT-HEADEDNESS: 1
ABDOMINAL PAIN: 0

## 2018-05-25 ASSESSMENT — ACTIVITIES OF DAILY LIVING (ADL)
TRANSFERRING: 1-->ASSISTIVE EQUIPMENT
TOILETING: 1-->ASSISTIVE EQUIPMENT
RETIRED_COMMUNICATION: 0-->UNDERSTANDS/COMMUNICATES WITHOUT DIFFICULTY
COGNITION: 0 - NO COGNITION ISSUES REPORTED
DRESS: 0-->INDEPENDENT
FALL_HISTORY_WITHIN_LAST_SIX_MONTHS: NO
BATHING: 2-->ASSISTIVE PERSON
AMBULATION: 1-->ASSISTIVE EQUIPMENT
RETIRED_EATING: 0-->INDEPENDENT
SWALLOWING: 0-->SWALLOWS FOODS/LIQUIDS WITHOUT DIFFICULTY

## 2018-05-25 NOTE — CONSULTS
[facility]  Gastroenterology Consultation         Alida Jefferson  [address]   [facility] [address]  89 year old female    Admission Date/Time: 5/25/2018  Primary Care Provider: Steffen Saunders  Referring / Attending Physician:  Dr. Thompson    We were asked to see the patient in consultation by Dr. Thompson for evaluation of acute GI bleed.      CC: Acute GI bleed    HPI:  Alida Jefferson is a 89 year old female who Who was admitted through ER when patient was brought to the ER by her care with caregiver with episode of acute episode of hematemesis patient threw up coffee-ground material around 5:00 this morning and then patient had multiple bouts of coffee-ground material and clots when patient came to the ER if patient's blood pressure was in 80s patient was given fluid of IV bolus her last hemoglobin was 7 baseline hemoglobin has been around 9-9.5.  Patient denied any recent use of nonsteroidals patient has been on iron supplement for chronic anemia patient has been on Coumadin in the past which was taken off patient is currently not on any anticoagulation.  Patient is denying any abdominal pain however patient has been feeling significantly weak and tired and anxious.  Patient denied any chest pain syncope.  Patient has been complaining of some symptoms of dysphagia off-and-on.  Patient is denying any fever chills or any other systemic complaints.  Patient is laying fairly comfortably in the bed.  Awake alert    ROS: A comprehensive ten point review of systems was negative aside from those in mentioned in the HPI.      PAST MED HX:  I have reviewed this patient's medical history and updated it with pertinent information if needed.   Past Medical History:   Diagnosis Date     Bioprosthetic mitral valve replacement, current hospitalization      Hypertension        MEDICATIONS:   Prior to Admission Medications   Prescriptions Last Dose Informant Patient Reported? Taking?    ALPRAZolam (XANAX PO) 5/24/2018 at hs Care Giver Yes Yes   Sig: Take 0.5 mg by mouth At Bedtime   AMIODARONE HCL PO 5/24/2018 at am Care Giver Yes Yes   Sig: Take 200 mg by mouth daily   ASPIRIN PO 5/24/2018 at am Care Giver Yes Yes   Sig: Take 325 mg by mouth daily   Acetaminophen (TYLENOL PO) More than a month at prn Care Giver Yes No   Sig: Take 1,000 mg by mouth every 8 hours as needed for mild pain or fever   Bumetanide (BUMEX PO) 5/24/2018 at am Care Giver Yes Yes   Sig: Take 1 mg by mouth daily   Docusate Sodium (COLACE PO) 5/24/2018 at am Care Giver Yes Yes   Sig: Take 100 mg by mouth daily   MELOXICAM PO 5/24/2018 at am Care Giver Yes Yes   Sig: Take 7.5 mg by mouth daily   METOLAZONE PO 5/21/2018 at am Care Giver Yes Yes   Sig: Take 2.5 mg by mouth twice a week Monday and Friday 30 minutes before Bumex   METOPROLOL SUCCINATE ER PO 5/24/2018 at am Care Giver Yes Yes   Sig: Take 25 mg by mouth daily   TRAMADOL HCL PO More than a month at Unknown time Care Giver Yes No   Sig: Take 50 mg by mouth every 6 hours as needed for moderate to severe pain   polyethylene glycol (MIRALAX/GLYCOLAX) Packet More than a month at prn Care Giver Yes No   Sig: Take 1 packet by mouth daily as needed for constipation   potassium chloride cy er (K-DUR) 5/24/2018 at am Care Giver Yes Yes   Sig: Take 10 mEq by mouth daily      Facility-Administered Medications: None       ALLERGIES:   Allergies   Allergen Reactions     Keflex [Cephalexin] Diarrhea     Ciprofloxacin Itching and Rash       SOCIAL HISTORY:  Social History   Substance Use Topics     Smoking status: Never Smoker     Smokeless tobacco: Never Used     Alcohol use No       FAMILY HISTORY:  History reviewed. No pertinent family history.    PHYSICAL EXAM:   General oriented fairly anxious, awake and alert  Vital Signs with Ranges  Temp: 97.9  F (36.6  C) Temp src: Oral BP: 117/73 Pulse: 105 Heart Rate: 100 Resp: 19 SpO2: 98 % O2 Device: None (Room air)          Constitutional: healthy, alert and no distress   Cardiovascular: negative, PMI normal. No lifts, heaves, or thrills. RRR. No murmurs, clicks gallops or rub  Respiratory: negative, Percussion normal. Good diaphragmatic excursion. Lungs clear  Head: Normocephalic. No masses, lesions, tenderness or abnormalities  Neck: Neck supple. No adenopathy. Thyroid symmetric, normal size,, Carotids without bruits.  Abdomen: Abdomen soft, non-tender. BS normal. No masses, organomegaly  SKIN: no suspicious lesions or rashes          ADDITIONAL COMMENTS:   I reviewed the patient's new clinical lab test results.   Recent Labs   Lab Test  05/25/18   1143  01/30/17   0600  08/23/16   0709   WBC  10.6  8.3  9.7   HGB  7.0*  9.4*  8.4*   MCV  89  82  97   PLT  211  319  182   INR  1.31*  2.87*  1.34*     Recent Labs   Lab Test  05/25/18   1143  01/30/17   0600  08/22/16   0703   POTASSIUM  4.5  3.7  4.8   CHLORIDE  106  101  103   CO2  30  25  26   BUN  61*  34*  22   ANIONGAP  7  10  5     Recent Labs   Lab Test  05/25/18   1143   ALBUMIN  2.6*   BILITOTAL  0.5   ALT  16   AST  19       I reviewed the patient's new imaging results.        CONSULTATION ASSESSMENT AND PLAN:    Active Problems:    UGI bleed    Assessment: 89-year-old very pleasant female with history of mitral valve replacement history of chronic anemia with complaint of acute onset of hematemesis with multiple episodes of coffee-ground vomiting with some clots patient's hemoglobin is 7 patient was given a bolus of fluid patient had hypotension and weakness on arrival to the ER.  Currently patient is feeling better patient is getting packed red blood cells patient is denying any active GI symptom patient last vomiting was about 2-3 hours ago.  Patient findings are quite consistent with acute upper GI bleed giving her history of dysphagia possible differential would include distal esophageal ulcer Michelle-Juarez tear possible neoplastic process involving GE junction  other differential would include gastric ulcer or duodenal ulcer.  Patient is currently getting blood transfusion I will recommend her to be started on IV Protonix serial hemoglobins stabilized hemodynamically and schedule her for emergent upper GI endoscopy this afternoon.  Plan was discussed in detail with patient including risks and benefits.  Thank you very much for letting me participate in her care.                 Ajay Bal MD, FACP  [facility].  Office: [phone]  Cell : [phone]

## 2018-05-25 NOTE — ED PROVIDER NOTES
History     Chief Complaint:  Hematemesis     HPI   Alida Jefferson is a 89 year old female who presents to the emergency department today via EMS for evaluation of hematemesis. Per EMS report the patient's 24 hour caretaker noticed the patient to begin vomiting blood around 0045 last night. This morning the patient had multiple other episodes of hematemesis, most recently around 1.5 hours prior to arrival which also contained a large blood clot. Due to this EMS was called and the patient was brought to the emergency department for evaluation. En route the patient's first blood pressure was 85 systolic so she was started on 500cc of IV fluids. She was also witnessed to have an episode of dark emesis en route to the emergency department. The patient was also complaining of some lightheadedness earlier in the morning. Here, the patient denies any nausea or abdominal pain. She does report that she also had one darker but non-bloody stool this morning. The patient denies any recent changes to her breathing or any worsening shortness of breath.     Allergies:  Keflex [Cephalexin]  Ciprofloxacin      Medications:    CARVEDILOL PO  ferrous gluconate (FERGON) 324 (38 FE) MG tablet  lisinopril-hydrochlorothiazide (PRINZIDE,ZESTORETIC) 10-12.5 MG per tablet  oxyCODONE (ROXICODONE) 5 MG immediate release tablet  senna-docusate (SENOKOT-S;PERICOLACE) 8.6-50 MG per tablet  zolpidem (AMBIEN) 5 MG tablet    Past Medical History:    Mitral valve replacement  hypertension    Past Surgical History:    Mitral valve replacement  Open reduction internal fixation hip nailing    Family History:    History reviewed. No pertinent family history.      Social History:  The patient was accompanied to the ED by EMS.  Smoking Status: Never smoker  Smokeless Tobacco: No  Alcohol Use: Ye s   Marital Status:        Review of Systems   Gastrointestinal: Positive for vomiting (bloody). Negative for abdominal pain, blood in stool and nausea.    Neurological: Positive for light-headedness.   All other systems reviewed and are negative.    Physical Exam     Patient Vitals for the past 24 hrs:   BP Temp Temp src Pulse Heart Rate Resp SpO2   05/25/18 1520 108/67 - - - 105 27 93 %   05/25/18 1515 - 97.6  F (36.4  C) - - - - -   05/25/18 1510 112/67 - - - 104 18 94 %   05/25/18 1500 122/65 - - - 99 18 95 %   05/25/18 1458 - 97.9  F (36.6  C) - - - - -   05/25/18 1456 - - - - 100 19 98 %   05/25/18 1455 117/73 - - - - - -   05/25/18 1445 (!) 131/95 - - - 104 29 (!) 88 %   05/25/18 1440 (!) 121/109 - - - 101 13 100 %   05/25/18 1436 113/85 - - - 104 22 100 %   05/25/18 1411 108/66 98.2  F (36.8  C) Oral - - - 94 %   05/25/18 1345 95/55 - - - 100 12 96 %   05/25/18 1330 99/61 - - - 89 23 96 %   05/25/18 1317 - 98.5  F (36.9  C) Temporal - - - -   05/25/18 1300 104/58 97.5  F (36.4  C) - 105 - 16 -   05/25/18 1146 - 97.4  F (36.3  C) Oral - - - -   05/25/18 1142 101/62 - - - 95 16 97 %      Physical Exam  SKIN:  Warm, dry.  HEMATOLOGIC/IMMUNOLOGIC/LYMPHATIC:  No pallor.  HENT:  Moist oral mucosa.  EYES:  Conjunctivae normal.  CARDIOVASCULAR:  Regular rate and rhythm.  No murmur.  RESPIRATORY:  No respiratory distress, breath sounds equal and normal.  GASTROINTESTINAL:  Soft, nontender abdomen with active bowel sounds.  No mass or distension.  GENITOURINARY:  GAYLE reveals brown stool.  MUSCULOSKELETAL:  Normal body habitus.  NEUROLOGIC:  Alert, conversant.    Emergency Department Course     ECG:  Indication: Hematemesis   Completed at 1202.  Read at 1210.   Atrial fibrillation. Incomplete RBBB. Anteroseptal infarct, age undetermined. Abnormal ECG.   Rate 91 bpm. CT interval *. QRS duration 98. QT/QTc 374/460 . P-R-T axes / 82 -87.     Laboratory:  Laboratory findings were communicated with the patient who voiced understanding of the findings.    CBC: WBC 10.6, HGB 7.0 (L),   CMP: Glucose 110 (H), BUN 61 (H), Creatinine 1.64 (H), GFR Estimate 29 (L), Calcium  7.8 (L), Albumin 2.6 (L), Protein total 52 (L), o/w WNL.   INR: 1.31 (H)   ABO/Rh type and screen: A-positive     Interventions:  1337 Protonix 40mg IV  1438 Hurricaine/Topex 20% spray  1438 Sublimaze 25mcg IV     Emergency Department Course:  Nursing notes and vitals reviewed.  1145 I performed an exam of the patient as documented above.   IV was inserted and blood was drawn for laboratory testing, results above.   EKG obtained in the ED, see results above.    1224 Patient rechecked and updated.    1256 I spoke with Dr. Valentine of the Hospitalist service regarding patient's presentation, findings, and plan of care.   1300 I spoke with Dr. Bal of the Gastroenterology service  regarding patient's presentation, findings, and plan of care.   1301 Patient rechecked and updated.    I discussed the treatment plan with the patient. They expressed understanding of this plan and consented to admission. I discussed the patient with Dr. Valentine, who will admit the patient to a monitored bed for further evaluation and treatment. I personally reviewed the laboratory results with the Patient and answered all related questions prior to admission.   Impression & Plan      Medical Decision Making:  This patient presents with a history indicative of upper gastrointestinal bleed. Hemoglobin had dropped which would support that. She was hemodynamically stable thankfully. Giver her level of anemia and this clinical history a blood transfusion was initiated during her time in emergency department. I consulted Dr. Bal who will perform endoscopy this afternoon.     Diagnosis:    ICD-10-CM    1. Upper GI bleed K92.2 CANCELED: Hemoglobin   2. Anemia, unspecified type D64.9        Disposition:  Admitted under the supervision of Dr. Valentine with Dr. Bal consulting.     Scribe Disclosure:  MARGO, Sunday Coello, am serving as a scribe at 11:39 AM on 5/25/2018 to document services personally performed by Oj Grayson MD based on my  observations and the provider's statements to me.    5/25/2018    EMERGENCY DEPARTMENT       Oj Grayson MD  05/25/18 5255

## 2018-05-25 NOTE — ED NOTES
[facility]  ED Nurse Handoff Report    ED Chief complaint: Hematemesis (Pt reports vomiting blood with clots starting last night, pt denies abdominal pain or CP.)      ED Diagnosis:   Final diagnoses:   Upper GI bleed   Anemia, unspecified type       Code Status: Full    Allergies:   Allergies   Allergen Reactions     Keflex [Cephalexin] Diarrhea     Ciprofloxacin Itching and Rash       Activity level - Baseline/Home:  Stand with assist    Activity Level - Current:   Stand with Assist of 2     Needed?: No    Isolation: No  Infection: Not Applicable    Bariatric?: No    Vital Signs:   Vitals:    05/25/18 1142 05/25/18 1146 05/25/18 1300   BP: 101/62  104/58   Pulse:   105   Resp: 16  16   Temp:  97.4  F (36.3  C) 97.5  F (36.4  C)   TempSrc:  Oral    SpO2: 97%         Cardiac Rhythm: ,        Pain level:      Is this patient confused?: No   Suicidality: Screened negative    Patient Report: Initial Complaint: Pt presents to the ED complaining of dark emesis since last night.  Focused Assessment: Pt denies pain, pt slightly hypotensive, other vital signs unremarkable.  Pt in a-fib on cardiac monitor.  Pt reports multiple episodes of dark colored emesis since last night.  Pt denies abdominal pain.  PCA reports blood in her depends breif as well.  Tests Performed: CBC, INR, Type and screen, CMP, hemacult stool.   Abnormal Results:  Creatinine 1.64, INR 1.31, HGB 7.0, hemacult pending.  Treatments provided: 1 unit of PRBC infusing.    Family Comments: PCA here, involved in care, daughter on the way.    OBS brochure/video discussed/provided to patient: N/A    ED Medications: Medications - No data to display    Drips infusing?:  Yes, blood    For the majority of the shift this patient was Green.   Interventions performed were na.    Severe Sepsis OR Septic Shock Diagnosis Present: No      ED NURSE PHONE NUMBER: [phone]

## 2018-05-25 NOTE — IP AVS SNAPSHOT
MRN:[id number]                      After Visit Summary   5/25/2018    Alida Jefferson    MRN: [id number]           Thank you!     Thank you for choosing [facility] for your care. Our goal is always to provide you with excellent care. Hearing back from our patients is one way we can continue to improve our services. Please take a few minutes to complete the written survey that you may receive in the mail after you visit with us. Thank you!        Patient Information     Date Of Birth          5/21/1923        Designated Caregiver       Most Recent Value    Caregiver    Will someone help with your care after discharge? yes    Name of designated caregiver Lorri (PCA)     Phone number of caregiver unable to give me    Caregiver address unable to give me info      About your hospital stay     You were admitted on:  May 25, 2018 You last received care in the:  [facility] [address] [facility]    You were discharged on:  May 28, 2018        Reason for your hospital stay       Upper GI bleed                  Who to Call     For medical emergencies, please call 911.  For non-urgent questions about your medical care, please call your primary care provider or clinic, [phone]  For questions related to your surgery, please call your surgery clinic        Attending Provider     Provider Specialty    Oj Grayson MD Emergency Medicine    Julián Valentine MD Internal Medicine       Primary Care Provider Office Phone # Fax #    Steffen Saunders [phone]      After Care Instructions     Activity       Your activity upon discharge: activity as tolerated            Diet       Follow this diet upon discharge: Orders Placed This Encounter      Mechanical/Dental Soft Diet            Discharge Instructions       Hold bumex and metolazone for another week, start back aspirin in 2 weeks if no more bleeding.check hemoglobin  In few days                  Follow-up Appointments   "   Follow-up and recommended labs and tests        Follow up with primary care provider, Steffen Saunders, within 7 days to evaluate medication change and for hospital follow- up.  The following labs/tests are recommended: cbc and basic metabolic panel in 5-6 days to check creatinine and hemoglobin .                  Additional Services     Home Care Referral       Resume home care                  Further instructions from your care team       If follow up needed with GI, please call:  [facility]  Office : [phone]    Pending Results     No orders found from 2018 to 2018.            Statement of Approval     Ordered          18 1414  I have reviewed and agree with all the recommendations and orders detailed in this document.  EFFECTIVE NOW     Approved and electronically signed by:  Mirtha Jeff MD             Admission Information     Date & Time Provider Department Dept. Phone    2018 Julián Valentine MD [place] [facility] [address] [facility] [phone]      Your Vitals Were     Blood Pressure Pulse Temperature Respirations Weight Pulse Oximetry    104/41 (BP Location: Left arm) 87 97.7  F (36.5  C) (Oral) 18 53.2 kg (117 lb 4.6 oz) 95%    BMI (Body Mass Index)                   19.52 kg/m2           MyChart Information     [facility] lets you send messages to your doctor, view your test results, renew your prescriptions, schedule appointments and more. To sign up, go to [email or web address] . Click on \"Log in\" on the left side of the screen, which will take you to the Welcome page. Then click on \"Sign up Now\" on the right side of the page.     You will be asked to enter the access code listed below, as well as some personal information. Please follow the directions to create your username and password.     Your access code is: [email or web address]  Expires: 2018  7:50 PM     Your access code will  in 90 days. If you need help or a new code, " please call your [facility] clinic or [phone].        Care EveryWhere ID     This is your Care EveryWhere ID. This could be used by other organizations to access your [facility] medical records  [id number]        Equal Access to Services     PENNIE EATON : Dago holland beverly Tang, waaxda luqadaha, qaybta kaalmada adeconstantinoyada, brit fitzgerald derrek schmitz. So [facility] [phone].    ATENCIÓN: Si habla español, tiene a thomas disposición servicios gratuitos de asistencia lingüística. Llame al [phone].    We comply with applicable federal civil rights laws and [place] laws. We do not discriminate on the basis of race, color, national origin, age, disability, sex, sexual orientation, or gender identity.               Review of your medicines      START taking        Dose / Directions    pantoprazole 40 MG EC tablet   Commonly known as:  PROTONIX   Used for:  Upper GI bleed        Dose:  40 mg   Start taking on:  5/29/2018   Take 1 tablet (40 mg) by mouth every morning   Quantity:  30 tablet   Refills:  1         CONTINUE these medicines which may have CHANGED, or have new prescriptions. If we are uncertain of the size of tablets/capsules you have at home, strength may be listed as something that might have changed.        Dose / Directions    aspirin 325 MG tablet   This may have changed:    - medication strength  - These instructions start on 6/11/2018. If you are unsure what to do until then, ask your doctor or other care provider.   Used for:  Upper GI bleed        Dose:  325 mg   Start taking on:  6/11/2018   Take 1 tablet (325 mg) by mouth daily   Quantity:  120 tablet   Refills:  0       BUMEX 1 MG tablet   This may have changed:    - medication strength  - additional instructions  - These instructions start on 6/5/2018. If you are unsure what to do until then, ask your doctor or other care provider.   Generic drug:  bumetanide        Dose:  1 mg   Start taking on:  6/5/2018   Take 1 tablet (1  mg) by mouth daily If blood pressure >110 systolic   Quantity:  30 tablet   Refills:  0       metoprolol succinate 25 MG 24 hr tablet   Commonly known as:  TOPROL-XL   This may have changed:    - medication strength  - how much to take   Used for:  Coronary artery disease involving native coronary artery of native heart, angina presence unspecified        Dose:  12.5 mg   Take 0.5 tablets (12.5 mg) by mouth daily   Quantity:  30 tablet   Refills:  0         CONTINUE these medicines which have NOT CHANGED        Dose / Directions    AMIODARONE HCL PO        Dose:  200 mg   Take 200 mg by mouth daily   Refills:  0       COLACE PO        Dose:  100 mg   Take 100 mg by mouth daily   Refills:  0       METOLAZONE PO        Dose:  2.5 mg   Take 2.5 mg by mouth twice a week Monday and Friday 30 minutes before Bumex   Refills:  0       polyethylene glycol Packet   Commonly known as:  MIRALAX/GLYCOLAX        Dose:  1 packet   Take 1 packet by mouth daily as needed for constipation   Refills:  0       potassium chloride cy er   Commonly known as:  K-DUR        Dose:  10 mEq   Take 10 mEq by mouth daily   Refills:  0       TYLENOL PO        Dose:  1000 mg   Take 1,000 mg by mouth every 8 hours as needed for mild pain or fever   Refills:  0       XANAX PO        Dose:  0.5 mg   Take 0.5 mg by mouth At Bedtime   Refills:  0         STOP taking     MELOXICAM PO           TRAMADOL HCL PO                Where to get your medicines      These medications were sent to [facility] - [address]  [address], [place] [address]     Phone:  [phone]     aspirin 325 MG tablet    metoprolol succinate 25 MG 24 hr tablet    pantoprazole 40 MG EC tablet                Protect others around you: Learn how to safely use, store and throw away your medicines at www.disposemymeds.org.             Medication List: This is a list of all your medications and when to take them. Check marks below  indicate your daily home schedule. Keep this list as a reference.      Medications           Morning Afternoon Evening Bedtime As Needed    AMIODARONE HCL PO   Take 200 mg by mouth daily   Last time this was given:  200 mg on 5/28/2018  8:41 AM                                aspirin 325 MG tablet   Take 1 tablet (325 mg) by mouth daily   Start taking on:  6/11/2018                                BUMEX 1 MG tablet   Take 1 tablet (1 mg) by mouth daily If blood pressure >110 systolic   Start taking on:  6/5/2018   Generic drug:  bumetanide                                COLACE PO   Take 100 mg by mouth daily                                METOLAZONE PO   Take 2.5 mg by mouth twice a week Monday and Friday 30 minutes before Bumex                                metoprolol succinate 25 MG 24 hr tablet   Commonly known as:  TOPROL-XL   Take 0.5 tablets (12.5 mg) by mouth daily                                pantoprazole 40 MG EC tablet   Commonly known as:  PROTONIX   Take 1 tablet (40 mg) by mouth every morning   Start taking on:  5/29/2018   Last time this was given:  40 mg on 5/28/2018  8:41 AM                                polyethylene glycol Packet   Commonly known as:  MIRALAX/GLYCOLAX   Take 1 packet by mouth daily as needed for constipation                                potassium chloride cy er   Commonly known as:  K-DUR   Take 10 mEq by mouth daily                                TYLENOL PO   Take 1,000 mg by mouth every 8 hours as needed for mild pain or fever                                XANAX PO   Take 0.5 mg by mouth At Bedtime   Last time this was given:  0.5 mg on 5/27/2018  9:29 PM

## 2018-05-25 NOTE — PHARMACY-ADMISSION MEDICATION HISTORY
Admission medication history interview status for the 5/25/2018  admission is complete. See EPIC admission navigator for prior to admission medications     Medication history source reliability:Good    Actions taken by pharmacist (provider contacted, etc):None     Additional medication history information not noted on PTA med list :  Patient's caregiver provided a detailed med list  Per the MAR patient stopped taking warfarin 4/26/18, but isn't entirely clear that this was the exact stop date    Medication reconciliation/reorder completed by provider prior to medication history? Yes       Time spent in this activity: 10 minutes    Prior to Admission medications    Medication Sig Last Dose Taking? Auth Provider   Acetaminophen (TYLENOL PO) Take 1,000 mg by mouth every 8 hours as needed for mild pain or fever More than a month at Unknown time  Unknown, Entered By History   ALPRAZolam (XANAX PO) Take 0.5 mg by mouth At Bedtime 5/24/2018 at hs Yes Unknown, Entered By History   AMIODARONE HCL PO Take 200 mg by mouth daily 5/24/2018 at am Yes Unknown, Entered By History   ASPIRIN PO Take 325 mg by mouth daily 5/24/2018 at am Yes Unknown, Entered By History   Bumetanide (BUMEX PO) Take 1 mg by mouth daily 5/24/2018 at am Yes Unknown, Entered By History   Docusate Sodium (COLACE PO) Take 100 mg by mouth daily 5/24/2018 at am Yes Unknown, Entered By History   MELOXICAM PO Take 7.5 mg by mouth daily 5/24/2018 at am Yes Unknown, Entered By History   METOLAZONE PO Take 2.5 mg by mouth twice a week Monday and Friday 30 minutes before Bumex 5/21/2018 at am Yes Unknown, Entered By History   METOPROLOL SUCCINATE ER PO Take 25 mg by mouth daily 5/24/2018 at am Yes Unknown, Entered By History   polyethylene glycol (MIRALAX/GLYCOLAX) Packet Take 1 packet by mouth daily as needed for constipation   Unknown, Entered By History   potassium chloride cy er (K-DUR) Take 10 mEq by mouth daily 5/24/2018 at am Yes Unknown, Entered By History    TRAMADOL HCL PO Take 50 mg by mouth every 6 hours as needed for moderate to severe pain More than a month at Unknown time  Unknown, Entered By History   Warfarin Therapy Reminder 1 each continuous prn   Hugo Somers MD

## 2018-05-25 NOTE — H&P
Admitted:     05/25/2018      HISTORY AND PHYSICAL       PRIMARY CARE PHYSICIAN:  Steffen Saunders MD      CHIEF COMPLAINT:  Vomiting blood.      HISTORY OF PRESENT ILLNESS:  History is reviewed from the patient and her caregiver present by the bedside.  The patient is pretty sharp for her age of 89.  Ms. Alida Jefferson is a pleasant 89-year-old female with a past medical history significant for hypertension, atrial fibrillation and CHF, who was brought to the ER today for vomiting blood.  She states starting last night she started vomiting blood with large clots, also noted some blood on her Depends.  I saw the pictures that her caregiver had taken.  She denied any fever, chills or rigors.  No chest pain or shortness of breath.  Denied pain in the abdomen.  Reported a normal bladder habit.  She is not sure if she had any melanotic stool, but this morning her stool was brown.  Of note, she has had some chronic back pain and has been taking meloxicam.  She was taken off of her Coumadin in January 2018.  Here in the ER, she was seen by Dr. Grayson.  Her hemoglobin was noted at 7.  She was ordered for 1 unit of PRBC and hospitalist requested admission for further evaluation.  Dr. Grayson also talked to Dr. Bal from GI, who plans to do EGD later today.      REVIEW OF SYSTEMS:  A 10-point review of systems was done and were negative apart from those mentioned in the history of present illness.      PAST MEDICAL HISTORY:   1.  Hypertension.   2.  Paroxysmal atrial fibrillation, taken off of Coumadin January 2018.   3.  Bioprosthetic mitral valve replacement.   4.  History of right hip ORIF in 2016.   5.  CHF with preserved ejection fraction.   6.  Insomnia.      MEDICATIONS PRIOR TO ADMISSION:  Prescriptions Prior to Admission   Medication Sig Dispense Refill Last Dose     Acetaminophen (TYLENOL PO) Take 1,000 mg by mouth every 8 hours as needed for mild pain or fever   More than a month at prn     ALPRAZolam (XANAX PO) Take  0.5 mg by mouth At Bedtime   5/24/2018 at hs     AMIODARONE HCL PO Take 200 mg by mouth daily   5/24/2018 at am     ASPIRIN PO Take 325 mg by mouth daily   5/24/2018 at am     Bumetanide (BUMEX PO) Take 1 mg by mouth daily   5/24/2018 at am     Docusate Sodium (COLACE PO) Take 100 mg by mouth daily   5/24/2018 at am     MELOXICAM PO Take 7.5 mg by mouth daily   5/24/2018 at am     METOLAZONE PO Take 2.5 mg by mouth twice a week Monday and Friday 30 minutes before Bumex   5/21/2018 at am     METOPROLOL SUCCINATE ER PO Take 25 mg by mouth daily   5/24/2018 at am     polyethylene glycol (MIRALAX/GLYCOLAX) Packet Take 1 packet by mouth daily as needed for constipation   More than a month at prn     potassium chloride cy er (K-DUR) Take 10 mEq by mouth daily   5/24/2018 at am     TRAMADOL HCL PO Take 50 mg by mouth every 6 hours as needed for moderate to severe pain   More than a month at Unknown time         ALLERGIES:  KEFLEX AND CIPROFLOXACIN.       SOCIAL HISTORY:  She is cared for by a caregiver.  She denies smoking, alcohol or illicit drug use.      FAMILY HISTORY:  Reviewed and not pertinent to current presentation.      PHYSICAL EXAMINATION:   GENERAL:  The patient is conscious, alert, oriented x3, lying comfortably in bed in no apparent distress.   VITAL SIGNS:  Temperature 98.5, heart rate of 105, blood pressure of 99/61, saturation 96% on room air.   HEENT:  Pupils are equal and reactive to light and accommodation.  Extraocular movements are intact.  Oral mucosa is moist.   NECK:  Supple, no rigidity.     RESPIRATORY:  Lung sounds bilaterally clear to auscultation, no wheezes or crepitation.   CARDIOVASCULAR:  Normal S1, S2, irregularly-irregular rhythm, no murmur.   ABDOMEN:  Soft, nontender, nondistended.  No guarding, rigidity or rebound tenderness.   LOWER EXTREMITIES:  With no edema.   NEUROLOGIC:  No focal neurological deficits noted.  Cranial nerves II-XII grossly intact.   PSYCHIATRIC:  Normal mood  and affect.      LABORATORY DATA AND IMAGING:  CMP notable for a BUN of 61, creatinine of 1.64, glucose 110, CBC with a WBC of 10.6, hemoglobin 7, platelet 211, INR 1.31.  EKG reviewed by me shows a normal sinus rhythm.        ASSESSMENT AND PLAN:  Ms. Alida Jefferson is a pleasant 89-year-old female with past medical history significant for hypertension, atrial fibrillation, bioprosthetic mitral valve replacement, right hip open reduction internal fixation, history of congestive heart failure with preserved ejection fraction, who presented to the ER with hematemesis.        1.  Acute blood loss anemia secondary to likely upper gastrointestinal bleed, likely related to NSAIDs.  The meloxicam that she has been taking for her back pain is probably the culprit along with her aspirin.  We will admit her as an inpatient.  Her blood pressure is on the softer side.  We will start her on normal saline at 50 mL per hour.  We will start Protonix 40 mg IV b.i.d.  Dr. Bal from GI is planning to do an EGD later this afternoon, getting 1 unit of PRBC in the ER.  We will monitor her hemoglobin q. 6 hours and plan to transfuse p.r.n. for hemoglobin of less than 7.  We will hold off on her aspirin and meloxicam.  Her baseline hemoglobin seems to be around 10.  Her last hemoglobin from March 2018 was 10.7.  Hemoglobin currently is 7.       2.  Hypertension with current hypotension secondary to gastrointestinal bleed.  We will hold off on her Bumex and metolazone.  We will continue Toprol-XL with hold parameters, mostly for rate control.  We will have hydralazine p.r.n. for systolic blood pressure more than 180.  We will continue with normal saline at 50 mL per hour until her blood pressure stabilizes and she is able to take p.o.     3.  Chronic atrial fibrillation.  Her rate currently seems to be controlled.  We will resume Toprol-XL with hold parameters when verified.  She has been taken off of Coumadin in January 2018 due to  advanced age and because of interactions with her pain medications.  We will hold off on her aspirin as well at this time because of the GI bleed with plans to resume aspirin after GI evaluation if okay with GI.  We will also continue with her amiodarone when verified by pharmacy.       4.  Congestive heart failure with preserved ejection fraction.  Her prior to admission medications include Bumex daily and metolazone twice a week.  Given the soft blood pressure, euvolemic status and hypotension related to the GI bleed, we will hold off on Bumex and metolazone.  We are starting normal saline at 50 mL per hour.  We will monitor fluid status closely, also continue Toprol-XL with hold parameters.     5.  Insomnia.  Resume prior to admission Ambien.    6.  Deep venous thrombosis prophylaxis:  Mechanical with PCD boots.   7.  Code status:  This was discussed and she wishes to be DNR/DNI.       8.  Likely chronic kidney disease.  Her baseline creatinine seems to be around 1.3 to 1.4, although in 2018 her creatinine was 1.93.  Currently creatinine is 1.64.  We will monitor BMP with IV hydration.  We are holding off on the Bumex and metolazone.            QUENTIN HURTADO MD             D: 2018   T: 2018   MT: WILBERTO      Name:     LEYDA BROWNING   MRN:      [id number]        Account:      [id number]   :      1923        Admitted:     2018                   Document: [id number]       cc: Steffen Saunders MD

## 2018-05-25 NOTE — ED NOTES
Bed: ED15  Expected date: 5/25/18  Expected time: 11:26 AM  Means of arrival: Ambulance  Comments:  442 89F GIbleed, hypotensive  ETA 1130

## 2018-05-25 NOTE — IP AVS SNAPSHOT
[facility] [address] [facility]    [address] [place] [address]    Phone:  [phone]                                       After Visit Summary   5/25/2018    Alida Jefferson    MRN: [id number]           After Visit Summary Signature Page     I have received my discharge instructions, and my questions have been answered. I have discussed any challenges I see with this plan with the nurse or doctor.    ..........................................................................................................................................  Patient/Patient Representative Signature      ..........................................................................................................................................  Patient Representative Print Name and Relationship to Patient    ..................................................               ................................................  Date                                            Time    ..........................................................................................................................................  Reviewed by Signature/Title    ...................................................              ..............................................  Date                                                            Time

## 2018-05-25 NOTE — PROGRESS NOTES
RECEIVING UNIT ED HANDOFF REVIEW    ED Nurse Handoff Report was reviewed by: Radha Mcmullen on May 25, 2018 at 1:46 PM

## 2018-05-25 NOTE — PROGRESS NOTES
Admission    Patient arrives to room 604-2 via cart from Endo  Care plan note: Pt admitted with GIB, Hgb-7.0-1u of PRBC transfused, tolerated well, re-check Hgb-8.4,  lactic-1.6, ordered per protocol, VSS on RA, denies pain, tolerated Cl liq for supper, no N/V, loose black stool#1, up w/1/GB/walker to BSC, tele-a-fib w/CVR/BBB, IVF infusing, GI/PT to follow up tomorrow, will monitor.    Inpatient nursing criteria listed below were met:    PCD's Documented: yes  Skin issues/needs documented :yes  Isolation education started/completed NA  Patient allergies verified with patient:yes  Fall Prevention: Care plan updated, Education given and documented yes  Care Plan initiated: yes  Home medications documented in belongings flowsheet: NA  Patient belongings documented in belongings flowsheet: yes  Reminder note (belongings/ medications) placed in discharge instructions:no  Admission profile/ required documentation complete: yes

## 2018-05-26 ENCOUNTER — APPOINTMENT (OUTPATIENT)
Dept: PHYSICAL THERAPY | Facility: CLINIC | Age: 83
DRG: 378 | End: 2018-05-26
Attending: HOSPITALIST
Payer: MEDICARE

## 2018-05-26 LAB
ANION GAP SERPL CALCULATED.3IONS-SCNC: 5 MMOL/L (ref 3–14)
BUN SERPL-MCNC: 72 MG/DL (ref 7–30)
CALCIUM SERPL-MCNC: 8.1 MG/DL (ref 8.5–10.1)
CHLORIDE SERPL-SCNC: 109 MMOL/L (ref 94–109)
CO2 SERPL-SCNC: 29 MMOL/L (ref 20–32)
CREAT SERPL-MCNC: 1.42 MG/DL (ref 0.52–1.04)
GFR SERPL CREATININE-BSD FRML MDRD: 34 ML/MIN/1.7M2
GLUCOSE SERPL-MCNC: 94 MG/DL (ref 70–99)
HGB BLD-MCNC: 7.6 G/DL (ref 11.7–15.7)
HGB BLD-MCNC: 7.6 G/DL (ref 11.7–15.7)
HGB BLD-MCNC: 7.8 G/DL (ref 11.7–15.7)
HGB BLD-MCNC: 7.9 G/DL (ref 11.7–15.7)
POTASSIUM SERPL-SCNC: 3.9 MMOL/L (ref 3.4–5.3)
SODIUM SERPL-SCNC: 143 MMOL/L (ref 133–144)

## 2018-05-26 PROCEDURE — 85018 HEMOGLOBIN: CPT | Performed by: INTERNAL MEDICINE

## 2018-05-26 PROCEDURE — 25000125 ZZHC RX 250: Performed by: HOSPITALIST

## 2018-05-26 PROCEDURE — 99233 SBSQ HOSP IP/OBS HIGH 50: CPT | Performed by: INTERNAL MEDICINE

## 2018-05-26 PROCEDURE — 36415 COLL VENOUS BLD VENIPUNCTURE: CPT | Performed by: HOSPITALIST

## 2018-05-26 PROCEDURE — 36415 COLL VENOUS BLD VENIPUNCTURE: CPT | Performed by: INTERNAL MEDICINE

## 2018-05-26 PROCEDURE — 85018 HEMOGLOBIN: CPT | Performed by: HOSPITALIST

## 2018-05-26 PROCEDURE — 80048 BASIC METABOLIC PNL TOTAL CA: CPT | Performed by: HOSPITALIST

## 2018-05-26 PROCEDURE — A9270 NON-COVERED ITEM OR SERVICE: HCPCS | Mod: GY | Performed by: HOSPITALIST

## 2018-05-26 PROCEDURE — 12000000 ZZH R&B MED SURG/OB

## 2018-05-26 PROCEDURE — 40000193 ZZH STATISTIC PT WARD VISIT

## 2018-05-26 PROCEDURE — 25000132 ZZH RX MED GY IP 250 OP 250 PS 637: Mod: GY | Performed by: HOSPITALIST

## 2018-05-26 PROCEDURE — 97161 PT EVAL LOW COMPLEX 20 MIN: CPT | Mod: GP

## 2018-05-26 RX ORDER — PANTOPRAZOLE SODIUM 40 MG/1
40 TABLET, DELAYED RELEASE ORAL EVERY MORNING
Status: DISCONTINUED | OUTPATIENT
Start: 2018-05-26 | End: 2018-05-28 | Stop reason: HOSPADM

## 2018-05-26 RX ORDER — TRAMADOL HYDROCHLORIDE 50 MG/1
50 TABLET ORAL EVERY 12 HOURS PRN
Status: DISCONTINUED | OUTPATIENT
Start: 2018-05-26 | End: 2018-05-28 | Stop reason: HOSPADM

## 2018-05-26 RX ADMIN — ALPRAZOLAM 0.5 MG: 0.5 TABLET ORAL at 22:31

## 2018-05-26 RX ADMIN — AMIODARONE HYDROCHLORIDE 200 MG: 200 TABLET ORAL at 09:08

## 2018-05-26 RX ADMIN — PANTOPRAZOLE SODIUM 40 MG: 40 INJECTION, POWDER, FOR SOLUTION INTRAVENOUS at 09:23

## 2018-05-26 NOTE — PLAN OF CARE
Problem: Patient Care Overview  Goal: Plan of Care/Patient Progress Review  Outcome: No Change  A&Ox4, forgetful. VSS, tachy at times. CMS intact. MCDOWELL. Tele Afib with CVR and BBB. AP irregular. Voiding in BSC, 3 loose BMs black/dark red, denies abd pain. Ambulating A1 gb/walker. Tolerating Clear liquid diet. IVF NS 50 ml/hr. Denies pain. Continue to monitor.

## 2018-05-26 NOTE — PROGRESS NOTES
05/26/18 1627   Quick Adds   Type of Visit Initial PT Evaluation   Living Environment   Lives With (24 hour caregiver)   Living Environment Comment Pt lives in one level home, has 24 hour caregiver   Self-Care   Usual Activity Tolerance fair   Current Activity Tolerance fair   Equipment Currently Used at Home walker, rolling  (4 wheeled)   Functional Level Prior   Ambulation 3-->assistive equipment and person   Transferring 3-->assistive equipment and person   Fall history within last six months no   Prior Functional Level Comment Pt reports she has a 24 hour caregiver who helps her with everything   General Information   Onset of Illness/Injury or Date of Surgery - Date 05/25/18   Referring Physician Julián Valentine MD   Patient/Family Goals Statement To go home   Pertinent History of Current Problem (include personal factors and/or comorbidities that impact the POC) Pt is 89 year old female adm on 5/25/18 for hematemesis. Pt hypotensive enroute. Pt admitted for GI bleed.   Precautions/Limitations fall precautions   Weight-Bearing Status - LUE full weight-bearing   Weight-Bearing Status - RUE full weight-bearing   Weight-Bearing Status - LLE full weight-bearing   Weight-Bearing Status - RLE full weight-bearing   General Info Comments Activity: up ad rodger   Cognitive Status Examination   Orientation orientation to person, place and time   Level of Consciousness alert  (sleepy)   Follows Commands and Answers Questions 100% of the time  (hard of hearing)   Pain Assessment   Patient Currently in Pain No   Posture    Posture Forward head position;Protracted shoulders;Kyphosis   Range of Motion (ROM)   ROM Comment B LE ROM WFL   Strength   Strength Comments B LE strength grossly 3/5   Bed Mobility   Bed Mobility Comments supine to sit, sit to supine supervision   Transfer Skills   Transfer Comments Sit to stand CGA   Gait   Gait Comments Pt ambulated 30 feet in room with RW, gait belt and CGA   Balance   Balance  "Comments Fair standing balance   Sensory Examination   Sensory Perception Comments Denies numbness or tingling   General Therapy Interventions   Intervention Comments Per pt report, appears to be at baseline level of functioning, has 24 hour caregiver to assist as needed at home, no further PT intervention while hospitalized, recommend pt continue to get OOB and ambulate with nursing staff.   Clinical Impression   Criteria for Skilled Therapeutic Intervention no;evaluation only   Anticipated Discharge Disposition Home with Assist   Risk & Benefits of therapy have been explained Yes   Patient, Family & other staff in agreement with plan of care Yes   [facility] TM \"6 Clicks\"   2016, Trustees of [facility], under license to [email or web address].  All rights reserved.   6 Clicks Short Forms Basic Mobility Inpatient Short Form   [facility]  \"6 Clicks\" V.2 Basic Mobility Inpatient Short Form   1. Turning from your back to your side while in a flat bed without using bedrails? 3 - A Little   2. Moving from lying on your back to sitting on the side of a flat bed without using bedrails? 3 - A Little   3. Moving to and from a bed to a chair (including a wheelchair)? 3 - A Little   4. Standing up from a chair using your arms (e.g., wheelchair, or bedside chair)? 3 - A Little   5. To walk in hospital room? 3 - A Little   6. Climbing 3-5 steps with a railing? 3 - A Little   Basic Mobility Raw Score (Score out of 24.Lower scores equate to lower levels of function) 18   Total Evaluation Time   Total Evaluation Time (Minutes) 20     "

## 2018-05-26 NOTE — PLAN OF CARE
Problem: Patient Care Overview  Goal: Plan of Care/Patient Progress Review  Outcome: No Change  A&O x4; forgetful. Tele afib w/ CVR & BBB. VSS on RA. Up with assist of 1. Tolerating clear liquid diet. Had a small and medium dark, bloody loose stool. Hgb 7.6. NS at 50ml/hr. D/c pending.

## 2018-05-26 NOTE — PROGRESS NOTES
[facility]  Gastroenterology Progress Note     Alida Jefferson MRN# [id number]   YOB: 1923 Age: 89 year old          Assessment and Plan:     UGI bleed    GI bleed due to NSAIDs  Doing very well no further signs of GI bleeding her hemoglobin is stable.  I will recommend the patient to be started on full liquid diet and advance as tolerated continue to check hemoglobin every 6 hours.  If patient is able to tolerate and hemoglobin is stable patient can be discharged home tomorrow.  I will recommend the patient to avoid all nonsteroidals.  Patient should continue on proton pump inhibitor on long-term basis.  I will recommend her to switch Protonix 40 mg once a day oral.  After 2 weeks patient should be okay to start her baby aspirin along with proton pump inhibitors.            Upper GI bleed  Anemia, unspecified type      Interval History:   doing well; no cp, sob, n/v/d, or abd pain.              Review of Systems:   C: NEGATIVE for fever, chills, change in weight  E/M: NEGATIVE for ear, mouth and throat problems  R: NEGATIVE for significant cough or SOB  CV: NEGATIVE for chest pain, palpitations or peripheral edema             Medications:   I have reviewed this patient's current medications    ALPRAZolam (XANAX) tablet 0.5 mg  0.5 mg Oral At Bedtime     amiodarone (PACERONE/CODARONE) tablet 200 mg  200 mg Oral Daily     metoprolol succinate (TOPROL-XL) 24 hr tablet 25 mg  25 mg Oral Daily     pantoprazole (PROTONIX) IV  40 mg Intravenous Q12H                  Physical Exam:   Vitals were reviewed  Vital Signs with Ranges  Temp:  [97.2  F (36.2  C)-98.5  F (36.9  C)] 97.3  F (36.3  C)  Pulse:  [] 82  Heart Rate:  [] 83  Resp:  [12-29] 18  BP: ()/() 97/60  SpO2:  [88 %-100 %] 96 %  I/O last 3 completed shifts:  In: 626.67 [P.O.:370; I.V.:256.67]  Out: -   Cardiovascular: negative, PMI normal. No lifts, heaves, or thrills. RRR. No murmurs, clicks gallops or  rub  Respiratory: negative, Percussion normal. Good diaphragmatic excursion. Lungs clear  Head: Normocephalic. No masses, lesions, tenderness or abnormalities  Neck: Neck supple. No adenopathy. Thyroid symmetric, normal size,, Carotids without bruits.  Abdomen: Abdomen soft, non-tender. BS normal. No masses, organomegaly  SKIN: no suspicious lesions or rashes           Data:   I reviewed the patient's new clinical lab test results.   Recent Labs   Lab Test  05/26/18   0600  05/26/18   0105  05/25/18   1755  05/25/18   1143  01/30/17   0600  08/23/16   0709   WBC   --    --    --   10.6  8.3  9.7   HGB  7.6*  7.6*  8.4*  7.0*  9.4*  8.4*   MCV   --    --    --   89  82  97   PLT   --    --    --   211  319  182   INR   --    --    --   1.31*  2.87*  1.34*     Recent Labs   Lab Test  05/26/18   0600  05/25/18   1143  01/30/17   0600   POTASSIUM  3.9  4.5  3.7   CHLORIDE  109  106  101   CO2  29  30  25   BUN  72*  61*  34*   ANIONGAP  5  7  10     Recent Labs   Lab Test  05/25/18   1143   ALBUMIN  2.6*   BILITOTAL  0.5   ALT  16   AST  19       I reviewed the patient's new imaging results.    All laboratory data reviewed  All imaging studies reviewed by me.    Ajay Bal MD,  5/26/2018  [facility]  Office : [phone]  Cell: [phone]

## 2018-05-26 NOTE — PLAN OF CARE
Problem: Patient Care Overview  Goal: Plan of Care/Patient Progress Review  Discharge Planner PT   Patient plan for discharge: home with 24 hour caregiver  Current status: Pt is able to perform bed mobility, transfers, and ambulation with RW with CGA of 1. Pt does not wish to further participate in physical therapy.  Barriers to return to prior living situation: None  Recommendations for discharge: Home with 24 hour caregiver  Rationale for recommendations: Pt is assist of 1 with RW, has 24 hour caregiver at home. Recommend discharge to home with 24 hour caregiver. Will complete PT orders at this time as pt reports she is at her baseline and does not wish to participate in physical therapy.       Entered by: Jessica Kennedy 05/26/2018 4:37 PM

## 2018-05-26 NOTE — PLAN OF CARE
Problem: Patient Care Overview  Goal: Plan of Care/Patient Progress Review  Outcome: Improving  A&O x4, [place]. BP soft (97/60), otherwise VSS on RA. Up w/1, walker, GB. LS diminished, c/o SOB/MCDOWELL. BS+, denies nausea, tolerated clear liquid diet, advanced to full liquids, per GI; advance to soft diet if tolerated. No BM this shift, no s/s of bleeding. Denies pain. Tele A fib with CVR and BBB. Hgb stable, 7.6/7.9, continue to recheck q6hr.

## 2018-05-26 NOTE — PROGRESS NOTES
Admission    Patient arrives to room 634-01 via cart from ER.  Care plan note: A&Ox4, forgetful. VSS, tachy at times. CMS intact. MCDOWELL. Tele Afib with CVR and BBB. AP irregular. Voiding in BSC, 3 loose BMs black/dark red, denies abd pain. Ambulating A1 gb/walker. Tolerating Clear liquid diet. IVF NS 50 ml/hr. Denies pain. Continue to monitor.     Inpatient nursing criteria listed below were met:    PCD's Documented: Yes   Skin issues/needs documented :Yes  Isolation education started/completed NA  Patient allergies verified with patient: No  Fall Prevention: Care plan updated, Education given and documented Yes  Care Plan initiated: Yes  Home medications documented in belongings flowsheet: No  Patient belongings documented in belongings flowsheet: No  Reminder note (belongings/ medications) placed in discharge instructions:No  Admission profile/ required documentation complete: No

## 2018-05-26 NOTE — DISCHARGE INSTRUCTIONS
If follow up needed with GI, please call:  [facility]  Office : [phone]

## 2018-05-26 NOTE — PROGRESS NOTES
[facility]    Hospitalist Progress Note    Assessment & Plan     Ms. Alida Jefferson is a pleasant 89-year-old female with past medical history significant for hypertension, atrial fibrillation, bioprosthetic mitral valve replacement, right hip open reduction internal fixation, history of congestive heart failure with preserved ejection fraction, who presented to the ER with hematemesis.         1.  Acute blood loss anemia secondary to likely upper gastrointestinal bleed, likely related to NSAIDs.   - The meloxicam that she has been taking for her back pain is probably the culprit along with her aspirin.    -sp 1 unit of PRBC in the ER  - monitor her hemoglobin q. 6 hours and plan to transfuse p.r.n. for hemoglobin of less than 7.    -status post esophagogastroduodenoscopy  - gastric ulcers noted  -appreciate GI input   - proton pump inhibitor once daily, restart aspirin in 2 weeks   -hemoglobin  Low but stable today, no active bleeding  -advance diet as tolerated  -physical therapy  To evaluation patient , might be able to discharge to her home with care takers tomorrow  -updated patient  and care giver.   2.  Hypertension with current hypotension secondary to gastrointestinal bleed.   - continue Toprol-XL with hold parameters, mostly for rate control.   -stop fluids   3.  Chronic atrial fibrillation.  Her rate currently seems to be controlled.  -continue Toprol-XL with hold parameters   -She has been taken off of Coumadin in January 2018 due to advanced age and because of interactions with her pain medications.    -aspirin in 2 weeks   4.  Congestive heart failure with preserved ejection fraction.    -restart  Bumex daily and metolazone on discharge   -stop fluids    5.  Insomnia.    - prior to admission Ambien.    6.  Deep venous thrombosis prophylaxis:    - Mechanical with PCD boots.   7.  Code status:   DNR/DNI.     8.  Likely chronic kidney disease.   - Her baseline creatinine seems to be  around 1.3 to 1.4, although in March 2018 her creatinine was 1.93.  --monitor creatinine  -basic metabolic panel in am         Mirtha Jeff MD  Text Page   (7am to 6pm)    Interval History   Had esophagogastroduodenoscopy  Yesterday, no active GI bleeding noted since, no nausea, care taker near beside, she has 24/7 care at home, does ambulate with 1 assist, looking forward to eating more than clear liquid diet today , no abdominal pain, hemoglobin  Low but stable, discussed plan of care with patient  and bystander.    -Data reviewed today: I reviewed all new labs and imaging results over the last 24 hours.   Physical Exam   Temp: 97.3  F (36.3  C) Temp src: Oral BP: 97/60 Pulse: 82 Heart Rate: 83 Resp: 18 SpO2: 96 % O2 Device: None (Room air)    Vitals:    05/26/18 0703   Weight: 53.2 kg (117 lb 4.6 oz)     Vital Signs with Ranges  Temp:  [97.2  F (36.2  C)-98.5  F (36.9  C)] 97.3  F (36.3  C)  Pulse:  [] 82  Heart Rate:  [] 83  Resp:  [12-29] 18  BP: ()/() 97/60  SpO2:  [88 %-100 %] 96 %  I/O last 3 completed shifts:  In: 626.67 [P.O.:370; I.V.:256.67]  Out: -     Constitutional: Awake, alert, cooperative, no apparent distress  Respiratory: Clear to auscultation bilaterally, no crackles or wheezing  Cardiovascular: Regular rate and rhythm, normal S1 and S2, and no murmur noted  GI: Normal bowel sounds, soft, non-distended, non-tender  Skin/Integumen: No rashes, no cyanosis, no edema  Neuro : moving all 4 extremities, no focal deficit noted     Medications     sodium chloride 50 mL/hr at 05/25/18 1703       ALPRAZolam (XANAX) tablet 0.5 mg  0.5 mg Oral At Bedtime     amiodarone (PACERONE/CODARONE) tablet 200 mg  200 mg Oral Daily     metoprolol succinate (TOPROL-XL) 24 hr tablet 25 mg  25 mg Oral Daily     pantoprazole  40 mg Oral QAM       Data     Recent Labs  Lab 05/26/18  1133 05/26/18  0600 05/26/18  0105  05/25/18  1143   WBC  --   --   --   --  10.6   HGB 7.9* 7.6* 7.6*  < > 7.0*    MCV  --   --   --   --  89   PLT  --   --   --   --  211   INR  --   --   --   --  1.31*   NA  --  143  --   --  143   POTASSIUM  --  3.9  --   --  4.5   CHLORIDE  --  109  --   --  106   CO2  --  29  --   --  30   BUN  --  72*  --   --  61*   CR  --  1.42*  --   --  1.64*   ANIONGAP  --  5  --   --  7   JASPREET  --  8.1*  --   --  7.8*   GLC  --  94  --   --  110*   ALBUMIN  --   --   --   --  2.6*   PROTTOTAL  --   --   --   --  5.2*   BILITOTAL  --   --   --   --  0.5   ALKPHOS  --   --   --   --  125   ALT  --   --   --   --  16   AST  --   --   --   --  19   < > = values in this interval not displayed.    Recent Labs  Lab 05/26/18  0600 05/25/18  1143   GLC 94 110*       Imaging:   No results found for this or any previous visit (from the past 24 hour(s)).

## 2018-05-27 LAB
ANION GAP SERPL CALCULATED.3IONS-SCNC: 6 MMOL/L (ref 3–14)
BUN SERPL-MCNC: 49 MG/DL (ref 7–30)
CALCIUM SERPL-MCNC: 8.2 MG/DL (ref 8.5–10.1)
CHLORIDE SERPL-SCNC: 110 MMOL/L (ref 94–109)
CO2 SERPL-SCNC: 27 MMOL/L (ref 20–32)
CREAT SERPL-MCNC: 1.21 MG/DL (ref 0.52–1.04)
GFR SERPL CREATININE-BSD FRML MDRD: 41 ML/MIN/1.7M2
GLUCOSE SERPL-MCNC: 78 MG/DL (ref 70–99)
HGB BLD-MCNC: 7.2 G/DL (ref 11.7–15.7)
HGB BLD-MCNC: 7.2 G/DL (ref 11.7–15.7)
HGB BLD-MCNC: 7.5 G/DL (ref 11.7–15.7)
HGB BLD-MCNC: 7.8 G/DL (ref 11.7–15.7)
POTASSIUM SERPL-SCNC: 3.7 MMOL/L (ref 3.4–5.3)
SODIUM SERPL-SCNC: 143 MMOL/L (ref 133–144)

## 2018-05-27 PROCEDURE — 25000132 ZZH RX MED GY IP 250 OP 250 PS 637: Mod: GY | Performed by: HOSPITALIST

## 2018-05-27 PROCEDURE — 99232 SBSQ HOSP IP/OBS MODERATE 35: CPT | Performed by: INTERNAL MEDICINE

## 2018-05-27 PROCEDURE — A9270 NON-COVERED ITEM OR SERVICE: HCPCS | Mod: GY | Performed by: HOSPITALIST

## 2018-05-27 PROCEDURE — 80048 BASIC METABOLIC PNL TOTAL CA: CPT | Performed by: INTERNAL MEDICINE

## 2018-05-27 PROCEDURE — 25000132 ZZH RX MED GY IP 250 OP 250 PS 637: Mod: GY | Performed by: INTERNAL MEDICINE

## 2018-05-27 PROCEDURE — 36415 COLL VENOUS BLD VENIPUNCTURE: CPT | Performed by: INTERNAL MEDICINE

## 2018-05-27 PROCEDURE — A9270 NON-COVERED ITEM OR SERVICE: HCPCS | Mod: GY | Performed by: INTERNAL MEDICINE

## 2018-05-27 PROCEDURE — 85018 HEMOGLOBIN: CPT | Performed by: INTERNAL MEDICINE

## 2018-05-27 PROCEDURE — 12000000 ZZH R&B MED SURG/OB

## 2018-05-27 RX ADMIN — PANTOPRAZOLE SODIUM 40 MG: 40 TABLET, DELAYED RELEASE ORAL at 07:58

## 2018-05-27 RX ADMIN — AMIODARONE HYDROCHLORIDE 200 MG: 200 TABLET ORAL at 07:57

## 2018-05-27 RX ADMIN — ALPRAZOLAM 0.5 MG: 0.5 TABLET ORAL at 21:29

## 2018-05-27 NOTE — PROGRESS NOTES
[facility]    Hospitalist Progress Note    Assessment & Plan     Ms. Alida Jefferson is a pleasant 89-year-old female with past medical history significant for hypertension, atrial fibrillation, bioprosthetic mitral valve replacement, right hip open reduction internal fixation, history of congestive heart failure with preserved ejection fraction, who presented to the ER with hematemesis.         1.  Acute blood loss anemia secondary to likely upper gastrointestinal bleed, likely related to NSAIDs.   - The meloxicam that she has been taking for her back pain is probably the culprit along with her aspirin.    -sp 1 unit of PRBC in the ER  - monitor her hemoglobin q. 6 hours and plan to transfuse p.r.n. for hemoglobin of less than 7.    Hemoglobin stable at 7.5 today.  -status post esophagogastroduodenoscopy  - gastric ulcers noted  -appreciate GI input   - proton pump inhibitor once daily, restart aspirin in 2 weeks   -hemoglobin  Low but stable today, no active bleeding  -advance diet as tolerated  -physical therapy  To continue evaluating the patient, might be able to discharge to her home with care takers tomorrow  -updated patient  ..   2.  Hypertension with current hypotension secondary to gastrointestinal bleed.   -hypotensive today   -monitor closely,hold discharge   - continue Toprol-XL with hold parameters, mostly for rate control.   -stop fluids   3.  Chronic atrial fibrillation.  Her rate currently seems to be controlled.  -continue Toprol-XL with hold parameters   -She has been taken off of Coumadin in January 2018 due to advanced age and because of interactions with her pain medications.    -aspirin in 2 weeks   4.  Congestive heart failure with preserved ejection fraction.    -restart  Bumex daily and metolazone on discharge   -stopped  Fluids 5/26    5.  Insomnia.    - prior to admission Ambien.    6.  Deep venous thrombosis prophylaxis:    - Mechanical with PCD boots.   7.  Code status:    DNR/DNI.     8.  Likely chronic kidney disease.   - Her baseline creatinine seems to be around 1.3 to 1.4, although in March 2018 her creatinine was 1.93.  --monitor creatinine, at baseline.          Mirtha Jeff MD  Text Page   (7am to 6pm)    Interval History   No active bleeding since the admission, she was feeling tired and dizzy today morning.  Her hemoglobin was stable at 7.5.  Her blood pressures were very low in the morning and her metoprolol was on hold.  Her diet is advanced and she is tolerating it well.  No nausea.    -Data reviewed today: I reviewed all new labs and imaging results over the last 24 hours.   Physical Exam   Temp: 97.7  F (36.5  C) Temp src: Oral BP: 106/58 Pulse: 71 Heart Rate: 77 Resp: 16 SpO2: 92 % O2 Device: None (Room air)    Vitals:    05/26/18 0703   Weight: 53.2 kg (117 lb 4.6 oz)     Vital Signs with Ranges  Temp:  [96.8  F (36  C)-98.1  F (36.7  C)] 97.7  F (36.5  C)  Pulse:  [69-80] 71  Heart Rate:  [77-82] 77  Resp:  [16-18] 16  BP: ()/(51-67) 106/58  SpO2:  [92 %-94 %] 92 %  I/O last 3 completed shifts:  In: 1450 [P.O.:480; I.V.:970]  Out: 1125 [Urine:1125]    Constitutional: Awake, alert, cooperative, no apparent distress  Respiratory: Clear to auscultation bilaterally, no crackles or wheezing  Cardiovascular: Regular rate and rhythm, normal S1 and S2, and no murmur noted  GI: Normal bowel sounds, soft, non-distended, non-tender  Skin/Integumen: No rashes, no cyanosis, no edema  Neuro : moving all 4 extremities, no focal deficit noted     Medications       ALPRAZolam (XANAX) tablet 0.5 mg  0.5 mg Oral At Bedtime     amiodarone (PACERONE/CODARONE) tablet 200 mg  200 mg Oral Daily     metoprolol succinate (TOPROL-XL) 24 hr tablet 25 mg  25 mg Oral Daily     pantoprazole  40 mg Oral QAM       Data     Recent Labs  Lab 05/27/18  1215 05/27/18  0602 05/26/18  2355  05/26/18  0600  05/25/18  1143   WBC  --   --   --   --   --   --  10.6   HGB 7.5* 7.2* 7.2*  < > 7.6*  <  > 7.0*   MCV  --   --   --   --   --   --  89   PLT  --   --   --   --   --   --  211   INR  --   --   --   --   --   --  1.31*   NA  --  143  --   --  143  --  143   POTASSIUM  --  3.7  --   --  3.9  --  4.5   CHLORIDE  --  110*  --   --  109  --  106   CO2  --  27  --   --  29  --  30   BUN  --  49*  --   --  72*  --  61*   CR  --  1.21*  --   --  1.42*  --  1.64*   ANIONGAP  --  6  --   --  5  --  7   JASPREET  --  8.2*  --   --  8.1*  --  7.8*   GLC  --  78  --   --  94  --  110*   ALBUMIN  --   --   --   --   --   --  2.6*   PROTTOTAL  --   --   --   --   --   --  5.2*   BILITOTAL  --   --   --   --   --   --  0.5   ALKPHOS  --   --   --   --   --   --  125   ALT  --   --   --   --   --   --  16   AST  --   --   --   --   --   --  19   < > = values in this interval not displayed.    Recent Labs  Lab 05/27/18  0602 05/26/18  0600 05/25/18  1143   GLC 78 94 110*       Imaging:   No results found for this or any previous visit (from the past 24 hour(s)).

## 2018-05-27 NOTE — PLAN OF CARE
Problem: Patient Care Overview  Goal: Plan of Care/Patient Progress Review  Outcome: Improving  Pt is A&O x4. Able to make needs known. [place] and uses bilaterally hearing aids. VSS on RA. LS dm/clear throughout. MCDOWELL/SOB. Tele A-fib with CVR and BBB. Reported back discomfort and requested back rub and lotion which was done by writer. Ambulate A-1 with walker and gb to bedside commode. Voiding adequately. 1X soft black bloody stool. Tolerated mechanical soft diet well. Up in chair for meal. Current Hgb level 7.8, recheck Q6H. R PIV damaged. L PIV SL, intact and patent. Fall precaution maintained. Will continue to monitor.   DNR/DNI

## 2018-05-27 NOTE — PLAN OF CARE
Problem: Patient Care Overview  Goal: Plan of Care/Patient Progress Review  Outcome: Improving  A&Ox4, [place]. VSS on RA. DNR/DNI. Here d/t GI bleed. EGD yesterday. GI consulted. Last Hg at midnight was 7.2. Ordered to be checked Q6. Order to transfuse if Hg <6. Diet advanced to mechanical soft. IV saline locked. Denied pain. Up w/ 1 assist, walker, and GB. Up to BSC during shift. Tele was A-Fib w/ CVR and BBB. Fall precautions in place. D/C possibly today back home w/ home healthcare. Will cont to monitor.

## 2018-05-27 NOTE — PLAN OF CARE
Problem: Patient Care Overview  Goal: Plan of Care/Patient Progress Review  Outcome: Improving  A&O x4. BP low (89/51), improved to 106/58, otherwise VSS on RA. Up w/1, walker, GB. LS diminished, MCDOWELL, improving. BS+, tolerating mechanical soft diet, denies nausea, no BM this shift, no s/s of bleeding. 0600 Hgb 7.2, 1200 Hgb 7.5, continue to recheck q6. Tele Afib with CVR and BBB.

## 2018-05-27 NOTE — PROGRESS NOTES
Care Transition Initial Assessment -   Reason For Consult: discharge planning  Active Problems:    UGI bleed    GI bleed due to NSAIDs       DATA  Lives With: other (see comments) (24 hour caregiver in home)  Living Arrangements: apartment  Description of Support System: Supportive, Involved  Who is your support system?: Children, PCA  Support Assessment: Adequate family and caregiver support, Adequate social supports.   Identified issues/concerns regarding health management: Pt has 24 hour caregiver at home. No further DC needs anticipated at this time.      Quality Of Family Relationships: supportive  Transportation Available: family or friend will provide    ASSESSMENT  Cognitive Status:  awake, alert and oriented  Concerns to be addressed: DC planning .  Pt lives in a one story living environment and has a 24 hour caregiver with her for assistance with all aspects of her care. Pt plans to return to her home and resume 24 hour caregiver support. PT assessed and recommends that pt return home with resumption of previous level of care.   DC date: TBD. No further SWS needs anticipated but SWS will remain available, as needed, until DC.    PLAN  Financial costs for the patient includes: Not discussed .  Patient given options and choices for discharge: N/A .  Patient/family is agreeable to the plan?  Yes  Patient Goals and Preferences: Pt's goal is to return home.  Patient anticipates discharging to:  Home with resumption of 24 hour caregiver.    LYLE Alaniz, [facility] *[phone]

## 2018-05-27 NOTE — PROVIDER NOTIFICATION
MD Notification    Notified Person: MD    Notified Person Name: Dr. Jeff    Notification Date/Time: 5/27/18 7190    Notification Interaction: paged/spoke with physician     Purpose of Notification:  Pts BP low, 89/51, metoprolol held,     Orders Received: no new orders    Comments:

## 2018-05-28 VITALS
DIASTOLIC BLOOD PRESSURE: 41 MMHG | BODY MASS INDEX: 19.52 KG/M2 | WEIGHT: 117.28 LBS | SYSTOLIC BLOOD PRESSURE: 104 MMHG | RESPIRATION RATE: 18 BRPM | OXYGEN SATURATION: 95 % | HEART RATE: 87 BPM | TEMPERATURE: 97.7 F

## 2018-05-28 LAB
ABO + RH BLD: NORMAL
ABO + RH BLD: NORMAL
ANION GAP SERPL CALCULATED.3IONS-SCNC: 6 MMOL/L (ref 3–14)
BLD GP AB SCN SERPL QL: NORMAL
BLD PROD TYP BPU: NORMAL
BLD PROD TYP BPU: NORMAL
BLD UNIT ID BPU: 0
BLOOD BANK CMNT PATIENT-IMP: NORMAL
BLOOD PRODUCT CODE: NORMAL
BPU ID: NORMAL
BUN SERPL-MCNC: 36 MG/DL (ref 7–30)
CALCIUM SERPL-MCNC: 7.9 MG/DL (ref 8.5–10.1)
CHLORIDE SERPL-SCNC: 108 MMOL/L (ref 94–109)
CO2 SERPL-SCNC: 27 MMOL/L (ref 20–32)
CREAT SERPL-MCNC: 1.14 MG/DL (ref 0.52–1.04)
GFR SERPL CREATININE-BSD FRML MDRD: 44 ML/MIN/1.7M2
GLUCOSE SERPL-MCNC: 79 MG/DL (ref 70–99)
HGB BLD-MCNC: 7 G/DL (ref 11.7–15.7)
HGB BLD-MCNC: 7.3 G/DL (ref 11.7–15.7)
HGB BLD-MCNC: 7.9 G/DL (ref 11.7–15.7)
NUM BPU REQUESTED: 2
POTASSIUM SERPL-SCNC: 4 MMOL/L (ref 3.4–5.3)
SODIUM SERPL-SCNC: 141 MMOL/L (ref 133–144)
SPECIMEN EXP DATE BLD: NORMAL
TRANSFUSION STATUS PATIENT QL: NORMAL
TRANSFUSION STATUS PATIENT QL: NORMAL

## 2018-05-28 PROCEDURE — 25000132 ZZH RX MED GY IP 250 OP 250 PS 637: Mod: GY | Performed by: HOSPITALIST

## 2018-05-28 PROCEDURE — P9016 RBC LEUKOCYTES REDUCED: HCPCS | Performed by: EMERGENCY MEDICINE

## 2018-05-28 PROCEDURE — 80048 BASIC METABOLIC PNL TOTAL CA: CPT | Performed by: INTERNAL MEDICINE

## 2018-05-28 PROCEDURE — 36415 COLL VENOUS BLD VENIPUNCTURE: CPT | Performed by: INTERNAL MEDICINE

## 2018-05-28 PROCEDURE — 85018 HEMOGLOBIN: CPT | Performed by: INTERNAL MEDICINE

## 2018-05-28 PROCEDURE — A9270 NON-COVERED ITEM OR SERVICE: HCPCS | Mod: GY | Performed by: HOSPITALIST

## 2018-05-28 PROCEDURE — A9270 NON-COVERED ITEM OR SERVICE: HCPCS | Mod: GY | Performed by: INTERNAL MEDICINE

## 2018-05-28 PROCEDURE — 25000132 ZZH RX MED GY IP 250 OP 250 PS 637: Mod: GY | Performed by: INTERNAL MEDICINE

## 2018-05-28 PROCEDURE — 99239 HOSP IP/OBS DSCHRG MGMT >30: CPT | Performed by: INTERNAL MEDICINE

## 2018-05-28 RX ORDER — ASPIRIN 325 MG
325 TABLET ORAL DAILY
Qty: 120 TABLET | Refills: 0 | Status: SHIPPED | OUTPATIENT
Start: 2018-06-11

## 2018-05-28 RX ORDER — METOPROLOL SUCCINATE 25 MG/1
12.5 TABLET, EXTENDED RELEASE ORAL DAILY
Qty: 30 TABLET | Refills: 0 | Status: SHIPPED | OUTPATIENT
Start: 2018-05-28

## 2018-05-28 RX ORDER — BUMETANIDE 1 MG/1
1 TABLET ORAL DAILY
Qty: 30 TABLET | Refills: 0 | COMMUNITY
Start: 2018-06-05

## 2018-05-28 RX ORDER — PANTOPRAZOLE SODIUM 40 MG/1
40 TABLET, DELAYED RELEASE ORAL EVERY MORNING
Qty: 30 TABLET | Refills: 1 | Status: SHIPPED | OUTPATIENT
Start: 2018-05-29

## 2018-05-28 RX ADMIN — PANTOPRAZOLE SODIUM 40 MG: 40 TABLET, DELAYED RELEASE ORAL at 08:41

## 2018-05-28 RX ADMIN — AMIODARONE HYDROCHLORIDE 200 MG: 200 TABLET ORAL at 08:41

## 2018-05-28 NOTE — DISCHARGE SUMMARY
[facility]    Discharge Summary  Hospitalist    Date of Admission:  5/25/2018  Date of Discharge:  5/28/2018  Discharging Provider: Mirtha Jeff MD    Discharge Diagnoses   Upper GI bleed    History of Present Illness   Alida Jefferson is an 89 year old female with a past medical history significant for hypertension, atrial fibrillation and CHF, who was brought to the ER today for vomiting blood.  She states starting last night she started vomiting blood with large clots, also noted some blood on her Depends.  I saw the pictures that her caregiver had taken.  She denied any fever, chills or rigors.  No chest pain or shortness of breath.  Denied pain in the abdomen.  Reported a normal bladder habit.  She is not sure if she had any melanotic stool, but this morning her stool was brown.  Of note, she has had some chronic back pain and has been taking meloxicam.  She was taken off of her Coumadin in January 2018.  Here in the ER, she was seen by Dr. Grayson.  Her hemoglobin was noted at 7.  She was ordered for 1 unit of PRBC and hospitalist requested admission for further evaluation.  Dr. Grayson also talked to Dr. Bal from GI, who plans to do EGD later that day.    Hospital Course   Alida Jefferson was admitted on 5/25/2018.  The following problems were addressed during her hospitalization:    Active Problems:    UGI bleed    GI bleed due to NSAIDs    Patient admitted with acute blood loss anemia secondary to upper GI bleed, likely related to NSAID use her meloxicam was stopped the day of admission, she received 1 unit of PRBC in the emergency room.  Her hemoglobin was monitored closely following transfusion and she was seen by gastroenterology.  She underwent upper endoscopy on the same day and was diagnosed with gastric ulcers.  GI had suggested proton pump inhibitor IV twice a day to once a day and to restart her aspirin in 2 weeks.  Her aspirin was stopped on she is not having chest pain the day of  admission.  Her hemoglobin remained stable throughout between 7 and 8 .   the plan on the day of discharge is to give her another unit of transfusion due to generalized weakness.  Meantime her diet was advanced slowly prior to discharge and she had tolerated it well.her blood pressure was low in few occasions.  Her metoprolol dose was  reduced in the day of discharge .Bumex and metolazone were on hold during her stay here.  Her creatinine is baseline at the time of discharge.  # Discharge Pain Plan:- During her hospitalization, Alida experienced pain due to arthritis.  The pain plan for discharge was discussed with Alida and the plan was created in a collaborative fashion.    - Pharmacologic adjuvants:  Acetaminophen    Mirtha Jeff MD    Significant Results and Procedures       Pending Results   These results will be followed up by   Unresulted Labs Ordered in the Past 30 Days of this Admission     No orders found from 3/26/2018 to 5/26/2018.          Code Status   DNR / DNI       Primary Care Physician   Steffen Saunders    Physical Exam   Temp: 97.2  F (36.2  C) Temp src: Oral BP: 97/54   Heart Rate: 85 Resp: 16 SpO2: 91 % O2 Device: None (Room air)    Vitals:    05/26/18 0703   Weight: 53.2 kg (117 lb 4.6 oz)     Vital Signs with Ranges  Temp:  [97.2  F (36.2  C)-97.7  F (36.5  C)] 97.2  F (36.2  C)  Heart Rate:  [73-90] 85  Resp:  [16-18] 16  BP: ()/(54-69) 97/54  SpO2:  [91 %-94 %] 91 %  I/O last 3 completed shifts:  In: 710 [P.O.:710]  Out: 550 [Urine:550]    The patient was examined on the day of discharge.    Discharge Disposition   Admited to home care:    Discharged to home  Condition at discharge: Stable    Consultations This Hospital Stay   PHYSICAL THERAPY ADULT IP CONSULT  SOCIAL WORK IP CONSULT  GASTROENTEROLOGY IP CONSULT    Time Spent on this Encounter   Mirtha ARAGON, personally saw the patient today and spent greater than 30 minutes discharging this patient.    Discharge Orders      Home Care Referral     Reason for your hospital stay   Upper GI bleed     Follow-up and recommended labs and tests    Follow up with primary care provider, Steffen Saunders, within 7 days to evaluate medication change and for hospital follow- up.  The following labs/tests are recommended: cbc and basic metabolic panel in 5-6 days to check creatinine and hemoglobin .     Activity   Your activity upon discharge: activity as tolerated     Discharge Instructions   Hold bumex and metolazone for another week, start back aspirin in 2 weeks if no more bleeding.check hemoglobin  In few days     DNR/DNI     Diet   Follow this diet upon discharge: Orders Placed This Encounter     Mechanical/Dental Soft Diet       Discharge Medications   Current Discharge Medication List      START taking these medications    Details   pantoprazole (PROTONIX) 40 MG EC tablet Take 1 tablet (40 mg) by mouth every morning  Qty: 30 tablet, Refills: 1    Associated Diagnoses: Upper GI bleed         CONTINUE these medications which have CHANGED    Details   aspirin 325 MG tablet Take 1 tablet (325 mg) by mouth daily  Qty: 120 tablet, Refills: 0    Comments: Start after 2 weeks  Associated Diagnoses: Upper GI bleed      bumetanide (BUMEX) 1 MG tablet Take 1 tablet (1 mg) by mouth daily If blood pressure >110 systolic  Qty: 30 tablet, Refills: 0      metoprolol succinate (TOPROL-XL) 25 MG 24 hr tablet Take 0.5 tablets (12.5 mg) by mouth daily  Qty: 30 tablet, Refills: 0    Associated Diagnoses: Coronary artery disease involving native coronary artery of native heart, angina presence unspecified         CONTINUE these medications which have NOT CHANGED    Details   Acetaminophen (TYLENOL PO) Take 1,000 mg by mouth every 8 hours as needed for mild pain or fever      ALPRAZolam (XANAX PO) Take 0.5 mg by mouth At Bedtime      AMIODARONE HCL PO Take 200 mg by mouth daily      Docusate Sodium (COLACE PO) Take 100 mg by mouth daily      METOLAZONE PO Take  2.5 mg by mouth twice a week Monday and Friday 30 minutes before Bumex      polyethylene glycol (MIRALAX/GLYCOLAX) Packet Take 1 packet by mouth daily as needed for constipation      potassium chloride cy er (K-DUR) Take 10 mEq by mouth daily         STOP taking these medications       MELOXICAM PO Comments:   Reason for Stopping:         TRAMADOL HCL PO Comments:   Reason for Stopping:             Allergies   Allergies   Allergen Reactions     Keflex [Cephalexin] Diarrhea     Ciprofloxacin Itching and Rash     Data   Most Recent 3 CBC's:  Recent Labs   Lab Test  05/28/18   1205  05/28/18   0603  05/28/18   0025   05/25/18   1143  01/30/17   0600  08/23/16   0709   WBC   --    --    --    --   10.6  8.3  9.7   HGB  7.9*  7.0*  7.3*   < >  7.0*  9.4*  8.4*   MCV   --    --    --    --   89  82  97   PLT   --    --    --    --   211  319  182    < > = values in this interval not displayed.      Most Recent 3 BMP's:  Recent Labs   Lab Test  05/28/18   0603  05/27/18   0602  05/26/18   0600   NA  141  143  143   POTASSIUM  4.0  3.7  3.9   CHLORIDE  108  110*  109   CO2  27  27  29   BUN  36*  49*  72*   CR  1.14*  1.21*  1.42*   ANIONGAP  6  6  5   JASPREET  7.9*  8.2*  8.1*   GLC  79  78  94     Most Recent 2 LFT's:  Recent Labs   Lab Test  05/25/18   1143   AST  19   ALT  16   ALKPHOS  125   BILITOTAL  0.5     Most Recent INR's and Anticoagulation Dosing History:  Anticoagulation Dose History     Recent Dosing and Labs Latest Ref Rng & Units 8/20/2016 8/21/2016 8/22/2016 8/23/2016 1/30/2017 5/25/2018    Warfarin 2.5 mg - 2.5 mg 2.5 mg - - - -    Warfarin 4 mg - - - 4 mg - - -    INR 0.86 - 1.14 0.94 1.12 1.20(H) 1.34(H) 2.87(H) 1.31(H)        Most Recent 3 Troponin's:No lab results found.  Most Recent Cholesterol Panel:No lab results found.  Most Recent 6 Bacteria Isolates From Any Culture (See EPIC Reports for Culture Details):No lab results found.  Most Recent TSH, T4 and A1c Labs:  Recent Labs   Lab Test  08/21/16    0602   TSH  0.28*   T4  1.39     Results for orders placed or performed during the hospital encounter of 01/30/17   XR Lumbar Spine 2/3 Views    Narrative    LUMBAR SPINE THREE VIEWS January 30, 2017 6:54 AM     HISTORY: Fall. Lumbar pain.    COMPARISON: None.      Impression    IMPRESSION:   1. Mild age-indeterminate compression deformities of the superior and  inferior endplates of the L1 vertebral body and superior endplate of  the T12 vertebral body.  2. Normal alignment of the lumbar spine.  3. Diffuse osteopenia.  4. Atherosclerotic calcification in the abdominal aorta.    CATA HOLLOWAY MD   CT Head w/o Contrast    Narrative    CT SCAN OF THE HEAD WITHOUT CONTRAST January 30, 2017 7:05 AM     HISTORY: Head injury, on coumadin    TECHNIQUE: Axial images of the head and coronal reformations without  IV contrast material.  Radiation dose for this scan was reduced using  automated exposure control, adjustment of the mA and/or kV according  to patient size, or iterative reconstruction technique.    COMPARISON: None.    FINDINGS: Mild cerebral atrophy is present. There are patchy white  matter changes in both hemispheres without mass effect. There is no  evidence for intracranial hemorrhage, mass effect, acute infarct, or  skull fracture. Some vascular calcifications are noted at the skull  base.      Impression    IMPRESSION: Chronic changes. No evidence for intracranial hemorrhage  or any acute process.    TRINY KATE MD   XR Pelvis and Hip Bilateral 2 Views    Narrative    PELVIS AND BILATERAL HIPS January 30, 2017 6:53 AM     HISTORY: Fall. Low back and bilateral hip pain.    COMPARISON: 8/20/2016.      Impression    IMPRESSION:   1. No convincing acute fracture or malalignment of the pelvis or  bilateral hips.  2. Diffuse osteopenia.  3. Surgical hardware is present in the right hip, bridging a healed  intertrochanteric fracture.    CATA HOLLOWAY MD   Wrist XR, G/E 3 views, left    Narrative    LEFT WRIST  3 VIEWS   1/30/2017 6:53 AM     HISTORY: Fall. Swelling.    COMPARISON: None.      Impression    IMPRESSION:   1. No convincing acute fracture or malalignment of the left wrist.  2. Mild chronic-appearing irregularity of the distal left radius,  likely related to an old fracture.  3. Diffuse osteopenia.  4. Moderate degenerative changes in the left first carpometacarpal  joint.  5. Chondrocalcinosis in the triangular fibrocartilage complex.    CATA HOLLOWAY MD

## 2018-05-28 NOTE — PROGRESS NOTES
SW: SW spoke to patient about DC plan. Patient indicated her caregiver will transport and informed that I should call her dtr [facility] to coordinate. SW spoke to Eden via TC and informed that dtr has already arranged for caregiver to be at hospital late afternoon to be ready to transport after blood transfusion completed. SW informed medical team of plan. Requested that nursing be sure there is an order for resumption of HC.    SW faxed DC orders for resumption of HC to [facility].    AMARI has identified no other social service needs at this time. SW will remain available should other needs arise.

## 2018-05-28 NOTE — PLAN OF CARE
Problem: Patient Care Overview  Goal: Plan of Care/Patient Progress Review  Outcome: Improving  A&O x4, [place]. BP soft, otherwise VSS on RA. Up w/1, walker, GB. LS diminished, MCDOWELL. Tele Afib with CVR and BBB.  BS+, denies nausea, tolerating mech soft diet, no BM this shift, no s/s of bleeding. Hgb 7.0 @ 0600, 7.9 @ 1200. Transfusing 1 unit RBCs per MD order; transfusion started and tolerated well by pt.

## 2018-05-28 NOTE — PROVIDER NOTIFICATION
MD Notification    Notified Person: MD    Notified Person Name:    Notification Date/Time: 5/28/18 0817     Notification Interaction: paged/talked with physician     Purpose of Notification: 0600 hgb 7.0. Order to transfuse <6.0, your note says transfuse <7.0. Should I transfuse? Please advise thanks! (FYI BP this am 112/60)    Orders Received: No need to transfuse    Comments:

## 2018-05-28 NOTE — PLAN OF CARE
Problem: Patient Care Overview  Goal: Plan of Care/Patient Progress Review  Outcome: Improving  A&Ox4, [place]. VSS on RA. DNR/DNI. Here d/t GI bleed. GI has signed off. Last Hg at midnight was 7.3. Ordered to be checked Q6. Mechanical soft diet. IV saline locked. Denied pain. Up w/ 1 assist, walker, and GB. Up to BSC during shift. Tele was A-Fib w/ CVR and BBB. Fall precautions in place. D/C possibly today back home w/ home healthcare. Will cont to monitor.

## 2018-05-28 NOTE — PLAN OF CARE
Problem: Patient Care Overview  Goal: Plan of Care/Patient Progress Review  Outcome: Improving  Pt is A&O x4. [place] and uses bilaterally hearing aids. VSS on RA with /69. LS clear bilaterally. MCDOWELL. Tele A-fib with CVR and BBB.  Ambulate A-1 in [place] and room. Voiding adequately, no Bm this shift. Mechanical soft diet  with good appetite. Up in chair for meal. Current Hgb level 7.8, recheck Q6H. L PIV SL  and patent. Fall precaution maintained. Will continue to monitor.   DNR/DNI

## 2018-05-29 NOTE — PLAN OF CARE
Problem: Patient Care Overview  Goal: Plan of Care/Patient Progress Review  Outcome: Adequate for Discharge Date Met: 05/28/18  Discharge    Patient discharged to home via wheelchair transport out to personal car with staff assist.  PCA transporting home.  Care plan note: A&Ox4.  SBA to Ax1 with GB, walker.  VSS on RA.  Finished blood transfusion, IV removed, and discharging home with 24 hour PCA.  Meds sent with, reviewed AVS with patient and PCA, provided discharge instructions and recommendations for follow up.    Listed belongings gathered and returned to patient. Yes - Pillow included.  Care Plan and Patient education resolved: Yes  Prescriptions if needed, hard copies sent with patient  No - Meds filled on site and sent with patient.  Home and hospital acquired medications returned to patient: Yes - See above.  Medication Bin checked and emptied on discharge Yes  Follow up appointment made for patient: No - Encouraged to follow up with PCP per AVS discharge instructions for lab work and med review.

## (undated) RX ORDER — FENTANYL CITRATE 50 UG/ML
INJECTION, SOLUTION INTRAMUSCULAR; INTRAVENOUS
Status: DISPENSED
Start: 2018-05-25